# Patient Record
Sex: MALE | Race: WHITE | Employment: OTHER | URBAN - METROPOLITAN AREA
[De-identification: names, ages, dates, MRNs, and addresses within clinical notes are randomized per-mention and may not be internally consistent; named-entity substitution may affect disease eponyms.]

---

## 2018-05-31 ENCOUNTER — HOSPITAL ENCOUNTER (OUTPATIENT)
Dept: PREADMISSION TESTING | Age: 73
Discharge: HOME | End: 2018-05-31
Attending: ORTHOPAEDIC SURGERY
Payer: MEDICARE

## 2018-05-31 LAB
ALBUMIN SERPL-MCNC: 3.9 G/DL (ref 3.4–5)
ALBUMIN/GLOB SERPL: 1.1 {RATIO} (ref 0.8–1.7)
ALP SERPL-CCNC: 75 U/L (ref 45–117)
ALT SERPL-CCNC: 34 U/L (ref 16–61)
ANION GAP SERPL CALC-SCNC: 12 MMOL/L (ref 3–18)
APPEARANCE UR: CLEAR
AST SERPL-CCNC: 29 U/L (ref 15–37)
ATRIAL RATE: 61 BPM
BACTERIA SPEC CULT: NORMAL
BACTERIA URNS QL MICRO: ABNORMAL /HPF
BILIRUB SERPL-MCNC: 0.3 MG/DL (ref 0.2–1)
BILIRUB UR QL: NEGATIVE
BUN SERPL-MCNC: 28 MG/DL (ref 7–18)
BUN/CREAT SERPL: 18 (ref 12–20)
CALCIUM SERPL-MCNC: 9.3 MG/DL (ref 8.5–10.1)
CALCULATED P AXIS, ECG09: 76 DEGREES
CALCULATED R AXIS, ECG10: 57 DEGREES
CALCULATED T AXIS, ECG11: 51 DEGREES
CHLORIDE SERPL-SCNC: 103 MMOL/L (ref 100–108)
CO2 SERPL-SCNC: 23 MMOL/L (ref 21–32)
COLOR UR: ABNORMAL
CREAT SERPL-MCNC: 1.58 MG/DL (ref 0.6–1.3)
DIAGNOSIS, 93000: NORMAL
EPITH CASTS URNS QL MICRO: ABNORMAL /LPF (ref 0–5)
ERYTHROCYTE [DISTWIDTH] IN BLOOD BY AUTOMATED COUNT: 14.5 % (ref 11.6–14.5)
EST. AVERAGE GLUCOSE BLD GHB EST-MCNC: 169 MG/DL
GLOBULIN SER CALC-MCNC: 3.4 G/DL (ref 2–4)
GLUCOSE SERPL-MCNC: 258 MG/DL (ref 74–99)
GLUCOSE UR STRIP.AUTO-MCNC: NEGATIVE MG/DL
HBA1C MFR BLD: 7.5 % (ref 4.5–5.6)
HCT VFR BLD AUTO: 38.1 % (ref 36–48)
HGB BLD-MCNC: 12.7 G/DL (ref 13–16)
HGB UR QL STRIP: NEGATIVE
KETONES UR QL STRIP.AUTO: NEGATIVE MG/DL
LEUKOCYTE ESTERASE UR QL STRIP.AUTO: NEGATIVE
MCH RBC QN AUTO: 32.2 PG (ref 24–34)
MCHC RBC AUTO-ENTMCNC: 33.3 G/DL (ref 31–37)
MCV RBC AUTO: 96.5 FL (ref 74–97)
NITRITE UR QL STRIP.AUTO: NEGATIVE
P-R INTERVAL, ECG05: 204 MS
PH UR STRIP: 5 [PH] (ref 5–8)
PLATELET # BLD AUTO: 288 K/UL (ref 135–420)
PMV BLD AUTO: 9.3 FL (ref 9.2–11.8)
POTASSIUM SERPL-SCNC: 4.8 MMOL/L (ref 3.5–5.5)
PROT SERPL-MCNC: 7.3 G/DL (ref 6.4–8.2)
PROT UR STRIP-MCNC: 30 MG/DL
Q-T INTERVAL, ECG07: 406 MS
QRS DURATION, ECG06: 92 MS
QTC CALCULATION (BEZET), ECG08: 408 MS
RBC # BLD AUTO: 3.95 M/UL (ref 4.7–5.5)
RBC #/AREA URNS HPF: ABNORMAL /HPF (ref 0–5)
SERVICE CMNT-IMP: NORMAL
SODIUM SERPL-SCNC: 138 MMOL/L (ref 136–145)
SP GR UR REFRACTOMETRY: 1.02 (ref 1–1.03)
UROBILINOGEN UR QL STRIP.AUTO: 1 EU/DL (ref 0.2–1)
VENTRICULAR RATE, ECG03: 61 BPM
WBC # BLD AUTO: 7.8 K/UL (ref 4.6–13.2)
WBC URNS QL MICRO: ABNORMAL /HPF (ref 0–5)

## 2018-05-31 PROCEDURE — 85027 COMPLETE CBC AUTOMATED: CPT | Performed by: ORTHOPAEDIC SURGERY

## 2018-05-31 PROCEDURE — 83036 HEMOGLOBIN GLYCOSYLATED A1C: CPT | Performed by: ORTHOPAEDIC SURGERY

## 2018-05-31 PROCEDURE — 87641 MR-STAPH DNA AMP PROBE: CPT | Performed by: ORTHOPAEDIC SURGERY

## 2018-05-31 PROCEDURE — 81001 URINALYSIS AUTO W/SCOPE: CPT | Performed by: ORTHOPAEDIC SURGERY

## 2018-05-31 PROCEDURE — 87086 URINE CULTURE/COLONY COUNT: CPT | Performed by: ORTHOPAEDIC SURGERY

## 2018-05-31 PROCEDURE — 93005 ELECTROCARDIOGRAM TRACING: CPT

## 2018-05-31 PROCEDURE — 36415 COLL VENOUS BLD VENIPUNCTURE: CPT | Performed by: ORTHOPAEDIC SURGERY

## 2018-05-31 PROCEDURE — 80053 COMPREHEN METABOLIC PANEL: CPT | Performed by: ORTHOPAEDIC SURGERY

## 2018-06-01 ENCOUNTER — HOSPITAL ENCOUNTER (OUTPATIENT)
Dept: PREADMISSION TESTING | Age: 73
Discharge: HOME | End: 2018-06-01

## 2018-06-02 LAB
BACTERIA SPEC CULT: NORMAL
SERVICE CMNT-IMP: NORMAL

## 2018-09-12 ENCOUNTER — HOSPITAL ENCOUNTER (OUTPATIENT)
Dept: PREADMISSION TESTING | Age: 73
Discharge: HOME | End: 2018-09-12
Payer: MEDICARE

## 2018-09-12 VITALS — BODY MASS INDEX: 42.66 KG/M2 | HEIGHT: 72 IN | WEIGHT: 315 LBS

## 2018-09-12 LAB
ALBUMIN SERPL-MCNC: 3.5 G/DL (ref 3.4–5)
ALBUMIN/GLOB SERPL: 1 {RATIO} (ref 0.8–1.7)
ALP SERPL-CCNC: 74 U/L (ref 45–117)
ALT SERPL-CCNC: 29 U/L (ref 16–61)
ANION GAP SERPL CALC-SCNC: 11 MMOL/L (ref 3–18)
APPEARANCE UR: CLEAR
AST SERPL-CCNC: 19 U/L (ref 15–37)
BACTERIA SPEC CULT: NORMAL
BILIRUB SERPL-MCNC: 0.2 MG/DL (ref 0.2–1)
BILIRUB UR QL: NEGATIVE
BUN SERPL-MCNC: 60 MG/DL (ref 7–18)
BUN/CREAT SERPL: 25 (ref 12–20)
CALCIUM SERPL-MCNC: 9.1 MG/DL (ref 8.5–10.1)
CHLORIDE SERPL-SCNC: 107 MMOL/L (ref 100–108)
CO2 SERPL-SCNC: 22 MMOL/L (ref 21–32)
COLOR UR: YELLOW
CREAT SERPL-MCNC: 2.42 MG/DL (ref 0.6–1.3)
ERYTHROCYTE [DISTWIDTH] IN BLOOD BY AUTOMATED COUNT: 14.3 % (ref 11.6–14.5)
GLOBULIN SER CALC-MCNC: 3.4 G/DL (ref 2–4)
GLUCOSE SERPL-MCNC: 180 MG/DL (ref 74–99)
GLUCOSE UR STRIP.AUTO-MCNC: NEGATIVE MG/DL
HBA1C MFR BLD: 6.7 % (ref 4.5–5.6)
HCT VFR BLD AUTO: 35.7 % (ref 36–48)
HGB BLD-MCNC: 11.7 G/DL (ref 13–16)
HGB UR QL STRIP: NEGATIVE
KETONES UR QL STRIP.AUTO: NEGATIVE MG/DL
LEUKOCYTE ESTERASE UR QL STRIP.AUTO: NEGATIVE
MCH RBC QN AUTO: 31.6 PG (ref 24–34)
MCHC RBC AUTO-ENTMCNC: 32.8 G/DL (ref 31–37)
MCV RBC AUTO: 96.5 FL (ref 74–97)
NITRITE UR QL STRIP.AUTO: NEGATIVE
PH UR STRIP: 5.5 [PH] (ref 5–8)
PLATELET # BLD AUTO: 257 K/UL (ref 135–420)
PMV BLD AUTO: 10.1 FL (ref 9.2–11.8)
POTASSIUM SERPL-SCNC: 5.8 MMOL/L (ref 3.5–5.5)
PROT SERPL-MCNC: 6.9 G/DL (ref 6.4–8.2)
PROT UR STRIP-MCNC: NEGATIVE MG/DL
RBC # BLD AUTO: 3.7 M/UL (ref 4.7–5.5)
SERVICE CMNT-IMP: NORMAL
SODIUM SERPL-SCNC: 140 MMOL/L (ref 136–145)
SP GR UR REFRACTOMETRY: 1.01 (ref 1–1.03)
UROBILINOGEN UR QL STRIP.AUTO: 0.2 EU/DL (ref 0.2–1)
WBC # BLD AUTO: 6.5 K/UL (ref 4.6–13.2)

## 2018-09-12 PROCEDURE — 81003 URINALYSIS AUTO W/O SCOPE: CPT | Performed by: ORTHOPAEDIC SURGERY

## 2018-09-12 PROCEDURE — 93005 ELECTROCARDIOGRAM TRACING: CPT

## 2018-09-12 PROCEDURE — 85027 COMPLETE CBC AUTOMATED: CPT | Performed by: ORTHOPAEDIC SURGERY

## 2018-09-12 PROCEDURE — 87641 MR-STAPH DNA AMP PROBE: CPT | Performed by: ORTHOPAEDIC SURGERY

## 2018-09-12 PROCEDURE — 83036 HEMOGLOBIN GLYCOSYLATED A1C: CPT | Performed by: ORTHOPAEDIC SURGERY

## 2018-09-12 PROCEDURE — 87086 URINE CULTURE/COLONY COUNT: CPT | Performed by: ORTHOPAEDIC SURGERY

## 2018-09-12 PROCEDURE — 80053 COMPREHEN METABOLIC PANEL: CPT | Performed by: ORTHOPAEDIC SURGERY

## 2018-09-12 PROCEDURE — 36415 COLL VENOUS BLD VENIPUNCTURE: CPT | Performed by: ORTHOPAEDIC SURGERY

## 2018-09-12 RX ORDER — DICLOFENAC SODIUM 10 MG/G
GEL TOPICAL AS NEEDED
COMMUNITY
End: 2018-09-29

## 2018-09-12 RX ORDER — SODIUM CHLORIDE, SODIUM LACTATE, POTASSIUM CHLORIDE, CALCIUM CHLORIDE 600; 310; 30; 20 MG/100ML; MG/100ML; MG/100ML; MG/100ML
125 INJECTION, SOLUTION INTRAVENOUS CONTINUOUS
Status: CANCELLED | OUTPATIENT
Start: 2018-09-26

## 2018-09-12 RX ORDER — TAMSULOSIN HYDROCHLORIDE 0.4 MG/1
0.4 CAPSULE ORAL DAILY
COMMUNITY

## 2018-09-12 RX ORDER — ALLOPURINOL 100 MG/1
100 TABLET ORAL 2 TIMES DAILY
COMMUNITY

## 2018-09-12 RX ORDER — ASPIRIN 325 MG
325 TABLET ORAL DAILY
COMMUNITY
End: 2018-09-29

## 2018-09-12 RX ORDER — GABAPENTIN 600 MG/1
1200 TABLET ORAL
COMMUNITY

## 2018-09-12 NOTE — PERIOP NOTES
Type and screen ,FBS on admit Instructed to bring cpap machine does not meet criteria for spec population emilia prep reviewed ,PCP aware of scheduled surgery ,patient was instructed to check with PCP or Dr Boom Mccray need to stop aspirin prior surgery

## 2018-09-13 LAB
ATRIAL RATE: 83 BPM
CALCULATED P AXIS, ECG09: 87 DEGREES
CALCULATED R AXIS, ECG10: 77 DEGREES
CALCULATED T AXIS, ECG11: 57 DEGREES
DIAGNOSIS, 93000: NORMAL
P-R INTERVAL, ECG05: 306 MS
Q-T INTERVAL, ECG07: 360 MS
QRS DURATION, ECG06: 94 MS
QTC CALCULATION (BEZET), ECG08: 423 MS
VENTRICULAR RATE, ECG03: 83 BPM

## 2018-09-14 LAB
BACTERIA SPEC CULT: NORMAL
SERVICE CMNT-IMP: NORMAL

## 2018-09-14 NOTE — PERIOP NOTES
Anesthesia comment re: increasing creatinine faxed to [facility]' office and message left for [facility]. Office closed early due to weather.

## 2018-09-24 PROBLEM — M17.11 PRIMARY OSTEOARTHRITIS OF RIGHT KNEE: Chronic | Status: ACTIVE | Noted: 2018-09-24

## 2018-09-24 NOTE — H&P
History and Physical 
 
 
 
Patient: Ana Rosa Bermudez               Sex: male          DOA: (Not on file) YOB: 1945      Age:  68 y.o.        LOS:  LOS: 0 days HPI:  
 
Nadege Vizcaino is in for followup of his right severe lateral tibiofemoral DJD/status post left ATTUNE TKA by Dr. 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 The patient's left knee is doing well. His left hip is bothering him some but just intermittently. His right knee is giving way on him and giving him more and more pain. It seems to be anterior and medial pain today. Standing AP, tunnel, lateral, and sunrise views of the bilateral knees were obtained and interpreted in the office and show good position of his left TKA without migration, loosening, or osteolysis. The right knee shows he has severe lateral tibiofemoral and moderate patellofemoral DJD. Past Medical History:  
Diagnosis Date  Agent orange exposure  Arthritis   
 osteo  Cancer ([facility] [address] [facility]  Diabetes ([facility] [address] [facility]  Diabetes mellitus  Edema   
 legs  Essential hypertension  GERD (gastroesophageal reflux disease)  Hypertension  Insomnia  Morbid obesity ([address]  Neuropathy  Neuropathy 1984  Pituitary adenoma ([facility] [address]  Skin cancer  Unspecified sleep apnea   
 uses CPAP Past Surgical History:  
Procedure Laterality Date  HX ADENOIDECTOMY  HX HEENT    
 sinus, deviated septum repair  HX KNEE REPLACEMENT Left 2014  HX MASTECTOMY Left   
 partial  
 HX ORTHOPAEDIC Left   
 arm- fracture  HX ORTHOPAEDIC Left   
 hardware removal  
 HX OTHER SURGICAL    
 lumps removed from earlobes  HX OTHER SURGICAL    
 numerous lumps removed from various sites  HX OTHER SURGICAL    
 BCC removed  HX TONSILLECTOMY  HX VASECTOMY No family history on file. Social History Social History  Marital status:    Spouse name: N/A  
  Number of children: N/A  
 Years of education: N/A Social History Main Topics  Smoking status: Former Smoker Packs/day: 1.50 Years: 20.00 Types: Cigarettes Quit date: 1/1/1996  Smokeless tobacco: Never Used  Alcohol use Yes Comment: rarely  Drug use: No  
 Sexual activity: Not on file Other Topics Concern  Not on file Social History Narrative Prior to Admission medications Medication Sig Start Date End Date Taking? Authorizing Provider  
allopurinol (ZYLOPRIM) 100 mg tablet Take 100 mg by mouth two (2) times a day. Historical Provider  
aspirin (ASPIRIN) 325 mg tablet Take 325 mg by mouth daily. Historical Provider  
diclofenac (VOLTAREN) 1 % gel Apply  to affected area as needed. Historical Provider  
gabapentin (NEURONTIN) 600 mg tablet Take 1,200 mg by mouth nightly. Historical Provider  
ferrous fumarate/vit Bcomp,C (SUPER B COMPLEX PO) Take  by mouth daily. Historical Provider  
tamsulosin (FLOMAX) 0.4 mg capsule Take 0.4 mg by mouth daily. Historical Provider  
insulin glargine (LANTUS SOLOSTAR) 100 unit/mL (3 mL) pen 82 Units by SubCUTAneous route two (2) times a day. Indications: type 2 diabetes mellitus    Historical Provider  
psyllium (METAMUCIL) powd Take  by mouth as needed. Historical Provider  
lisinopril (PRINIVIL, ZESTRIL) 40 mg tablet Take 40 mg by mouth daily. Indications: hypertension    Historical Provider  
omeprazole (PRILOSEC) 20 mg capsule Take 20 mg by mouth daily. Pt instructed to take am of surgery. Indications: GASTROESOPHAGEAL REFLUX    Historical Provider  
potassium chloride SR (KLOR-CON) 8 mEq tablet Take 8 mEq by mouth daily. Historical Provider  
rosuvastatin (CRESTOR) 40 mg tablet Take 40 mg by mouth nightly. NON FORMULARY  Indications: HYPERCHOLESTEROLEMIA    Historical Provider  
furosemide (LASIX) 40 mg tablet Take 40 mg by mouth two (2) times a day. Indications: EDEMA    Historical Provider losartan (COZAAR) 50 mg tablet Take 50 mg by mouth daily. Indications: hypertension    Historical Provider  
gabapentin (NEURONTIN) 300 mg capsule Take 600 mg by mouth daily. Indications: NEUROPATHIC PAIN    Historical Provider  
glimepiride (AMARYL) 2 mg tablet Take 4 mg by mouth two (2) times a day. Indications: type 2 diabetes mellitus    Historical Provider NIFEdipine CR (ADALAT CC) 60 mg CR tablet Take 120 mg by mouth daily. Pt instructed to take am of surgery. Indications: hypertension    Historical Provider No Known Allergies Review of Systems A comprehensive review of systems was negative except for that written in the History of Present Illness. Physical Exam:  
  
There were no vitals taken for this visit. Physical Exam: 
Exam of the patient's right knee, he has mild genu valgum. He has almost full extension with flexion beyond 90 degrees. Otherwise, examination of the knee demonstrates the patient has a negative Lachman and has no varus or valgus instability at zero degrees or 30 degrees. There is a negative posterior sag. There is no knee effusion. There is no swelling, ecchymosis, or wounds. The patient has no medial or lateral joint line pain and no popliteal pain. The patient has a negative patellar inhibition, a negative patellar grind, and a negative patellar apprehension test.  There is a negative Brodie Maneuver. There is no pain at the inferior pole of the patella or the tibial tubercle. The patient has 5/5 muscle strength with good quadriceps tone. The patient has good capillary refill with normal motor strength of the foot and ankle and normal light-touch sensation in the foot and lower leg. Assessment/Plan Principal Problem: 
  Primary osteoarthritis of right knee (9/24/2018) Active Problems: Hypertension (2/18/2014) Gastroesophageal reflux disease (2/18/2014) Diabetes mellitus ([facility] (2/18/2014) Sleep apnea (2/18/2014) Hypercholesteremia (2/18/2014) Dr. Lorna Yin asked him to follow up two weeks postop. Dr. Lorna Yin is going to schedule him for a right DePuy ATTUNE TKA. He will be in the hospital overnight. We talked about the risks, alternatives, and benefits including infection, pain, bleeding, and DVT, and he is willing to proceed. He is on a full strength aspirin regularly per day, so we will just do full strength aspirin twice a day afterwards. Dr. Lorna Yin will give him perioperative IV antibiotics.

## 2018-09-25 ENCOUNTER — ANESTHESIA EVENT (OUTPATIENT)
Dept: SURGERY | Age: 73
DRG: 470 | End: 2018-09-25
Payer: MEDICARE

## 2018-09-25 RX ORDER — GUAIFENESIN 100 MG/5ML
81 LIQUID (ML) ORAL 2 TIMES DAILY
Status: CANCELLED | OUTPATIENT
Start: 2018-09-25

## 2018-09-26 ENCOUNTER — ANESTHESIA (OUTPATIENT)
Dept: SURGERY | Age: 73
DRG: 470 | End: 2018-09-26
Payer: MEDICARE

## 2018-09-26 ENCOUNTER — HOSPITAL ENCOUNTER (INPATIENT)
Age: 73
LOS: 3 days | Discharge: NURSING, REHAB OR LONG-TERM CARE | DRG: 470 | End: 2018-09-29
Attending: ORTHOPAEDIC SURGERY | Admitting: ORTHOPAEDIC SURGERY
Payer: MEDICARE

## 2018-09-26 DIAGNOSIS — M17.11 PRIMARY OSTEOARTHRITIS OF RIGHT KNEE: Primary | Chronic | ICD-10-CM

## 2018-09-26 LAB
ABO + RH BLD: NORMAL
BLOOD GROUP ANTIBODIES SERPL: NORMAL
GLUCOSE BLD STRIP.AUTO-MCNC: 119 MG/DL (ref 70–110)
GLUCOSE BLD STRIP.AUTO-MCNC: 168 MG/DL (ref 70–110)
GLUCOSE BLD STRIP.AUTO-MCNC: 206 MG/DL (ref 70–110)
GLUCOSE BLD STRIP.AUTO-MCNC: 208 MG/DL (ref 70–110)
SPECIMEN EXP DATE BLD: NORMAL

## 2018-09-26 PROCEDURE — 74011250636 HC RX REV CODE- 250/636: Performed by: ORTHOPAEDIC SURGERY

## 2018-09-26 PROCEDURE — 97161 PT EVAL LOW COMPLEX 20 MIN: CPT

## 2018-09-26 PROCEDURE — 76060000034 HC ANESTHESIA 1.5 TO 2 HR: Performed by: ORTHOPAEDIC SURGERY

## 2018-09-26 PROCEDURE — 74011250637 HC RX REV CODE- 250/637: Performed by: PHYSICIAN ASSISTANT

## 2018-09-26 PROCEDURE — C1713 ANCHOR/SCREW BN/BN,TIS/BN: HCPCS | Performed by: ORTHOPAEDIC SURGERY

## 2018-09-26 PROCEDURE — 77030032490 HC SLV COMPR SCD KNE COVD -B: Performed by: ORTHOPAEDIC SURGERY

## 2018-09-26 PROCEDURE — 77030012508 HC MSK AIRWY LMA AMBU -A: Performed by: ANESTHESIOLOGY

## 2018-09-26 PROCEDURE — 77030018836 HC SOL IRR NACL ICUM -A: Performed by: ORTHOPAEDIC SURGERY

## 2018-09-26 PROCEDURE — L1830 KO IMMOB CANVAS LONG PRE OTS: HCPCS | Performed by: ORTHOPAEDIC SURGERY

## 2018-09-26 PROCEDURE — 65270000029 HC RM PRIVATE

## 2018-09-26 PROCEDURE — 0SRC0J9 REPLACEMENT OF RIGHT KNEE JOINT WITH SYNTHETIC SUBSTITUTE, CEMENTED, OPEN APPROACH: ICD-10-PCS | Performed by: ORTHOPAEDIC SURGERY

## 2018-09-26 PROCEDURE — 77030013708 HC HNDPC SUC IRR PULS STRY –B: Performed by: ORTHOPAEDIC SURGERY

## 2018-09-26 PROCEDURE — C1776 JOINT DEVICE (IMPLANTABLE): HCPCS | Performed by: ORTHOPAEDIC SURGERY

## 2018-09-26 PROCEDURE — 74011250636 HC RX REV CODE- 250/636: Performed by: PHYSICIAN ASSISTANT

## 2018-09-26 PROCEDURE — 74011636637 HC RX REV CODE- 636/637: Performed by: PHYSICIAN ASSISTANT

## 2018-09-26 PROCEDURE — 74011000250 HC RX REV CODE- 250

## 2018-09-26 PROCEDURE — 74011000250 HC RX REV CODE- 250: Performed by: ORTHOPAEDIC SURGERY

## 2018-09-26 PROCEDURE — 77030037875 HC DRSG MEPILEX <16IN BORD MOLN -A: Performed by: ORTHOPAEDIC SURGERY

## 2018-09-26 PROCEDURE — 77030016060 HC NDL NRV BLK TELE -A: Performed by: ANESTHESIOLOGY

## 2018-09-26 PROCEDURE — 97116 GAIT TRAINING THERAPY: CPT

## 2018-09-26 PROCEDURE — 74011250636 HC RX REV CODE- 250/636

## 2018-09-26 PROCEDURE — 77030003666 HC NDL SPINAL BD -A: Performed by: ORTHOPAEDIC SURGERY

## 2018-09-26 PROCEDURE — 74011250637 HC RX REV CODE- 250/637: Performed by: ANESTHESIOLOGY

## 2018-09-26 PROCEDURE — 82962 GLUCOSE BLOOD TEST: CPT

## 2018-09-26 PROCEDURE — 64450 NJX AA&/STRD OTHER PN/BRANCH: CPT | Performed by: ANESTHESIOLOGY

## 2018-09-26 PROCEDURE — 77030038020 HC MANFLD NEPTUNE STRY -B: Performed by: ORTHOPAEDIC SURGERY

## 2018-09-26 PROCEDURE — 77030031139 HC SUT VCRL2 J&J -A: Performed by: ORTHOPAEDIC SURGERY

## 2018-09-26 PROCEDURE — 77030020782 HC GWN BAIR PAWS FLX 3M -B: Performed by: ORTHOPAEDIC SURGERY

## 2018-09-26 PROCEDURE — 77030039267 HC ADH SKN EXOFIN S2SG -B: Performed by: ORTHOPAEDIC SURGERY

## 2018-09-26 PROCEDURE — 3E0T3BZ INTRODUCTION OF ANESTHETIC AGENT INTO PERIPHERAL NERVES AND PLEXI, PERCUTANEOUS APPROACH: ICD-10-PCS | Performed by: ANESTHESIOLOGY

## 2018-09-26 PROCEDURE — 76942 ECHO GUIDE FOR BIOPSY: CPT | Performed by: ORTHOPAEDIC SURGERY

## 2018-09-26 PROCEDURE — 77030002934 HC SUT MCRYL J&J -B: Performed by: ORTHOPAEDIC SURGERY

## 2018-09-26 PROCEDURE — 94660 CPAP INITIATION&MGMT: CPT

## 2018-09-26 PROCEDURE — 77030034694 HC SCPL CANADY PLSM DISP USMD -E: Performed by: ORTHOPAEDIC SURGERY

## 2018-09-26 PROCEDURE — 86900 BLOOD TYPING SEROLOGIC ABO: CPT | Performed by: ORTHOPAEDIC SURGERY

## 2018-09-26 PROCEDURE — 76210000016 HC OR PH I REC 1 TO 1.5 HR: Performed by: ORTHOPAEDIC SURGERY

## 2018-09-26 PROCEDURE — 77030038010: Performed by: ORTHOPAEDIC SURGERY

## 2018-09-26 PROCEDURE — 77030038011: Performed by: ORTHOPAEDIC SURGERY

## 2018-09-26 PROCEDURE — 77030027138 HC INCENT SPIROMETER -A: Performed by: ORTHOPAEDIC SURGERY

## 2018-09-26 PROCEDURE — 74011636637 HC RX REV CODE- 636/637: Performed by: ANESTHESIOLOGY

## 2018-09-26 PROCEDURE — 77030036563 HC WRP CLD THER KNE S2SG -B: Performed by: ORTHOPAEDIC SURGERY

## 2018-09-26 PROCEDURE — 74011000258 HC RX REV CODE- 258: Performed by: ORTHOPAEDIC SURGERY

## 2018-09-26 PROCEDURE — 76010000153 HC OR TIME 1.5 TO 2 HR: Performed by: ORTHOPAEDIC SURGERY

## 2018-09-26 DEVICE — COMPONENT PAT DIA38MM KNEE POLY CEM MEDIALIZED ANAT ATTUNE: Type: IMPLANTABLE DEVICE | Site: KNEE | Status: FUNCTIONAL

## 2018-09-26 DEVICE — IMPLANTABLE DEVICE: Type: IMPLANTABLE DEVICE | Site: KNEE | Status: FUNCTIONAL

## 2018-09-26 DEVICE — CEMENT BNE 20GM HALF DOSE PMMA VISC RADPQ FAST: Type: IMPLANTABLE DEVICE | Site: KNEE | Status: FUNCTIONAL

## 2018-09-26 DEVICE — INSERT TIB RP FEM KNEE CEM: Type: IMPLANTABLE DEVICE | Site: KNEE | Status: FUNCTIONAL

## 2018-09-26 RX ORDER — SODIUM CHLORIDE 0.9 % (FLUSH) 0.9 %
5-10 SYRINGE (ML) INJECTION AS NEEDED
Status: DISCONTINUED | OUTPATIENT
Start: 2018-09-26 | End: 2018-09-26 | Stop reason: HOSPADM

## 2018-09-26 RX ORDER — SODIUM CHLORIDE, SODIUM LACTATE, POTASSIUM CHLORIDE, CALCIUM CHLORIDE 600; 310; 30; 20 MG/100ML; MG/100ML; MG/100ML; MG/100ML
125 INJECTION, SOLUTION INTRAVENOUS CONTINUOUS
Status: DISCONTINUED | OUTPATIENT
Start: 2018-09-26 | End: 2018-09-26 | Stop reason: HOSPADM

## 2018-09-26 RX ORDER — FUROSEMIDE 40 MG/1
40 TABLET ORAL 2 TIMES DAILY
Status: DISCONTINUED | OUTPATIENT
Start: 2018-09-27 | End: 2018-09-29 | Stop reason: HOSPADM

## 2018-09-26 RX ORDER — LIDOCAINE HYDROCHLORIDE 20 MG/ML
INJECTION, SOLUTION EPIDURAL; INFILTRATION; INTRACAUDAL; PERINEURAL AS NEEDED
Status: DISCONTINUED | OUTPATIENT
Start: 2018-09-26 | End: 2018-09-26 | Stop reason: HOSPADM

## 2018-09-26 RX ORDER — SODIUM CHLORIDE 0.9 % (FLUSH) 0.9 %
5-10 SYRINGE (ML) INJECTION AS NEEDED
Status: DISCONTINUED | OUTPATIENT
Start: 2018-09-26 | End: 2018-09-26

## 2018-09-26 RX ORDER — SODIUM CHLORIDE, SODIUM LACTATE, POTASSIUM CHLORIDE, CALCIUM CHLORIDE 600; 310; 30; 20 MG/100ML; MG/100ML; MG/100ML; MG/100ML
125 INJECTION, SOLUTION INTRAVENOUS CONTINUOUS
Status: DISCONTINUED | OUTPATIENT
Start: 2018-09-26 | End: 2018-09-29 | Stop reason: HOSPADM

## 2018-09-26 RX ORDER — ONDANSETRON 4 MG/1
4 TABLET, ORALLY DISINTEGRATING ORAL
Status: DISCONTINUED | OUTPATIENT
Start: 2018-09-26 | End: 2018-09-29 | Stop reason: HOSPADM

## 2018-09-26 RX ORDER — OMEPRAZOLE 20 MG/1
20 CAPSULE, DELAYED RELEASE ORAL DAILY
Status: DISCONTINUED | OUTPATIENT
Start: 2018-09-27 | End: 2018-09-29 | Stop reason: HOSPADM

## 2018-09-26 RX ORDER — DEXTROSE 50 % IN WATER (D50W) INTRAVENOUS SYRINGE
25-50 AS NEEDED
Status: DISCONTINUED | OUTPATIENT
Start: 2018-09-26 | End: 2018-09-26

## 2018-09-26 RX ORDER — ONDANSETRON 2 MG/ML
INJECTION INTRAMUSCULAR; INTRAVENOUS AS NEEDED
Status: DISCONTINUED | OUTPATIENT
Start: 2018-09-26 | End: 2018-09-26 | Stop reason: HOSPADM

## 2018-09-26 RX ORDER — DEXMEDETOMIDINE HYDROCHLORIDE 4 UG/ML
INJECTION, SOLUTION INTRAVENOUS AS NEEDED
Status: DISCONTINUED | OUTPATIENT
Start: 2018-09-26 | End: 2018-09-26 | Stop reason: HOSPADM

## 2018-09-26 RX ORDER — POTASSIUM CHLORIDE 20MEQ/15ML
8 LIQUID (ML) ORAL DAILY
Status: DISCONTINUED | OUTPATIENT
Start: 2018-09-27 | End: 2018-09-27

## 2018-09-26 RX ORDER — ROSUVASTATIN CALCIUM 10 MG/1
40 TABLET, COATED ORAL
Status: DISCONTINUED | OUTPATIENT
Start: 2018-09-26 | End: 2018-09-29 | Stop reason: HOSPADM

## 2018-09-26 RX ORDER — NALOXONE HYDROCHLORIDE 0.4 MG/ML
0.4 INJECTION, SOLUTION INTRAMUSCULAR; INTRAVENOUS; SUBCUTANEOUS AS NEEDED
Status: DISCONTINUED | OUTPATIENT
Start: 2018-09-26 | End: 2018-09-29 | Stop reason: HOSPADM

## 2018-09-26 RX ORDER — ACETAMINOPHEN 325 MG/1
650 TABLET ORAL
Status: DISCONTINUED | OUTPATIENT
Start: 2018-09-26 | End: 2018-09-29 | Stop reason: HOSPADM

## 2018-09-26 RX ORDER — MIDAZOLAM HYDROCHLORIDE 1 MG/ML
INJECTION, SOLUTION INTRAMUSCULAR; INTRAVENOUS AS NEEDED
Status: DISCONTINUED | OUTPATIENT
Start: 2018-09-26 | End: 2018-09-26 | Stop reason: HOSPADM

## 2018-09-26 RX ORDER — CELECOXIB 100 MG/1
400 CAPSULE ORAL
Status: COMPLETED | OUTPATIENT
Start: 2018-09-26 | End: 2018-09-26

## 2018-09-26 RX ORDER — MAGNESIUM SULFATE 100 %
4 CRYSTALS MISCELLANEOUS AS NEEDED
Status: DISCONTINUED | OUTPATIENT
Start: 2018-09-26 | End: 2018-09-29 | Stop reason: HOSPADM

## 2018-09-26 RX ORDER — ALLOPURINOL 100 MG/1
100 TABLET ORAL 2 TIMES DAILY
Status: DISCONTINUED | OUTPATIENT
Start: 2018-09-26 | End: 2018-09-29 | Stop reason: HOSPADM

## 2018-09-26 RX ORDER — GABAPENTIN 300 MG/1
600 CAPSULE ORAL DAILY
Status: DISCONTINUED | OUTPATIENT
Start: 2018-09-27 | End: 2018-09-29 | Stop reason: HOSPADM

## 2018-09-26 RX ORDER — LOSARTAN POTASSIUM 50 MG/1
50 TABLET ORAL DAILY
Status: DISCONTINUED | OUTPATIENT
Start: 2018-09-27 | End: 2018-09-29 | Stop reason: HOSPADM

## 2018-09-26 RX ORDER — TAMSULOSIN HYDROCHLORIDE 0.4 MG/1
0.4 CAPSULE ORAL DAILY
Status: DISCONTINUED | OUTPATIENT
Start: 2018-09-27 | End: 2018-09-29 | Stop reason: HOSPADM

## 2018-09-26 RX ORDER — HYDROMORPHONE HYDROCHLORIDE 2 MG/ML
0.5 INJECTION, SOLUTION INTRAMUSCULAR; INTRAVENOUS; SUBCUTANEOUS
Status: DISCONTINUED | OUTPATIENT
Start: 2018-09-26 | End: 2018-09-26

## 2018-09-26 RX ORDER — MAGNESIUM SULFATE 100 %
4 CRYSTALS MISCELLANEOUS AS NEEDED
Status: DISCONTINUED | OUTPATIENT
Start: 2018-09-26 | End: 2018-09-26

## 2018-09-26 RX ORDER — MULTIVIT WITH MINERALS/HERBS
1 TABLET ORAL DAILY
Status: DISCONTINUED | OUTPATIENT
Start: 2018-09-27 | End: 2018-09-29 | Stop reason: HOSPADM

## 2018-09-26 RX ORDER — INSULIN LISPRO 100 [IU]/ML
INJECTION, SOLUTION INTRAVENOUS; SUBCUTANEOUS
Status: DISCONTINUED | OUTPATIENT
Start: 2018-09-26 | End: 2018-09-29 | Stop reason: HOSPADM

## 2018-09-26 RX ORDER — OXYCODONE AND ACETAMINOPHEN 5; 325 MG/1; MG/1
1 TABLET ORAL AS NEEDED
Status: DISCONTINUED | OUTPATIENT
Start: 2018-09-26 | End: 2018-09-26

## 2018-09-26 RX ORDER — SODIUM CHLORIDE 0.9 % (FLUSH) 0.9 %
5-10 SYRINGE (ML) INJECTION EVERY 8 HOURS
Status: DISCONTINUED | OUTPATIENT
Start: 2018-09-26 | End: 2018-09-26 | Stop reason: HOSPADM

## 2018-09-26 RX ORDER — NALOXONE HYDROCHLORIDE 0.4 MG/ML
0.1 INJECTION, SOLUTION INTRAMUSCULAR; INTRAVENOUS; SUBCUTANEOUS
Status: DISCONTINUED | OUTPATIENT
Start: 2018-09-26 | End: 2018-09-26

## 2018-09-26 RX ORDER — LISINOPRIL 20 MG/1
40 TABLET ORAL DAILY
Status: DISCONTINUED | OUTPATIENT
Start: 2018-09-27 | End: 2018-09-29 | Stop reason: HOSPADM

## 2018-09-26 RX ORDER — SODIUM CHLORIDE, SODIUM LACTATE, POTASSIUM CHLORIDE, CALCIUM CHLORIDE 600; 310; 30; 20 MG/100ML; MG/100ML; MG/100ML; MG/100ML
50 INJECTION, SOLUTION INTRAVENOUS CONTINUOUS
Status: DISCONTINUED | OUTPATIENT
Start: 2018-09-26 | End: 2018-09-26

## 2018-09-26 RX ORDER — DIPHENHYDRAMINE HCL 25 MG
25 CAPSULE ORAL
Status: DISCONTINUED | OUTPATIENT
Start: 2018-09-26 | End: 2018-09-29 | Stop reason: HOSPADM

## 2018-09-26 RX ORDER — BISACODYL 5 MG
5 TABLET, DELAYED RELEASE (ENTERIC COATED) ORAL DAILY PRN
Status: DISCONTINUED | OUTPATIENT
Start: 2018-09-26 | End: 2018-09-29 | Stop reason: HOSPADM

## 2018-09-26 RX ORDER — ACETAMINOPHEN 500 MG
1000 TABLET ORAL ONCE
Status: COMPLETED | OUTPATIENT
Start: 2018-09-26 | End: 2018-09-26

## 2018-09-26 RX ORDER — FENTANYL CITRATE 50 UG/ML
25 INJECTION, SOLUTION INTRAMUSCULAR; INTRAVENOUS
Status: DISCONTINUED | OUTPATIENT
Start: 2018-09-26 | End: 2018-09-26

## 2018-09-26 RX ORDER — TRANEXAMIC ACID 650 1/1
1950 TABLET ORAL ONCE
Status: COMPLETED | OUTPATIENT
Start: 2018-09-26 | End: 2018-09-26

## 2018-09-26 RX ORDER — HYDROMORPHONE HYDROCHLORIDE 2 MG/ML
INJECTION, SOLUTION INTRAMUSCULAR; INTRAVENOUS; SUBCUTANEOUS AS NEEDED
Status: DISCONTINUED | OUTPATIENT
Start: 2018-09-26 | End: 2018-09-26 | Stop reason: HOSPADM

## 2018-09-26 RX ORDER — SODIUM CHLORIDE 0.9 % (FLUSH) 0.9 %
5-10 SYRINGE (ML) INJECTION AS NEEDED
Status: DISCONTINUED | OUTPATIENT
Start: 2018-09-26 | End: 2018-09-29 | Stop reason: HOSPADM

## 2018-09-26 RX ORDER — INSULIN LISPRO 100 [IU]/ML
INJECTION, SOLUTION INTRAVENOUS; SUBCUTANEOUS ONCE
Status: COMPLETED | OUTPATIENT
Start: 2018-09-26 | End: 2018-09-26

## 2018-09-26 RX ORDER — DEXTROSE 50 % IN WATER (D50W) INTRAVENOUS SYRINGE
25-50 AS NEEDED
Status: DISCONTINUED | OUTPATIENT
Start: 2018-09-26 | End: 2018-09-29 | Stop reason: HOSPADM

## 2018-09-26 RX ORDER — PROPOFOL 10 MG/ML
INJECTION, EMULSION INTRAVENOUS AS NEEDED
Status: DISCONTINUED | OUTPATIENT
Start: 2018-09-26 | End: 2018-09-26 | Stop reason: HOSPADM

## 2018-09-26 RX ORDER — SODIUM CHLORIDE 0.9 % (FLUSH) 0.9 %
5-10 SYRINGE (ML) INJECTION EVERY 8 HOURS
Status: DISCONTINUED | OUTPATIENT
Start: 2018-09-26 | End: 2018-09-29 | Stop reason: HOSPADM

## 2018-09-26 RX ORDER — ALBUTEROL SULFATE 90 UG/1
AEROSOL, METERED RESPIRATORY (INHALATION) AS NEEDED
Status: DISCONTINUED | OUTPATIENT
Start: 2018-09-26 | End: 2018-09-29 | Stop reason: HOSPADM

## 2018-09-26 RX ORDER — ONDANSETRON 2 MG/ML
4 INJECTION INTRAMUSCULAR; INTRAVENOUS ONCE
Status: DISCONTINUED | OUTPATIENT
Start: 2018-09-26 | End: 2018-09-26

## 2018-09-26 RX ORDER — SODIUM CHLORIDE, SODIUM LACTATE, POTASSIUM CHLORIDE, CALCIUM CHLORIDE 600; 310; 30; 20 MG/100ML; MG/100ML; MG/100ML; MG/100ML
75 INJECTION, SOLUTION INTRAVENOUS CONTINUOUS
Status: DISPENSED | OUTPATIENT
Start: 2018-09-26 | End: 2018-09-27

## 2018-09-26 RX ORDER — NIFEDIPINE 60 MG/1
120 TABLET, EXTENDED RELEASE ORAL DAILY
Status: DISCONTINUED | OUTPATIENT
Start: 2018-09-27 | End: 2018-09-29 | Stop reason: HOSPADM

## 2018-09-26 RX ORDER — OXYCODONE HYDROCHLORIDE 5 MG/1
5-10 TABLET ORAL
Status: DISCONTINUED | OUTPATIENT
Start: 2018-09-26 | End: 2018-09-27

## 2018-09-26 RX ORDER — GLYCOPYRROLATE 0.2 MG/ML
INJECTION INTRAMUSCULAR; INTRAVENOUS AS NEEDED
Status: DISCONTINUED | OUTPATIENT
Start: 2018-09-26 | End: 2018-09-26 | Stop reason: HOSPADM

## 2018-09-26 RX ORDER — ZOLPIDEM TARTRATE 5 MG/1
5 TABLET ORAL
Status: DISCONTINUED | OUTPATIENT
Start: 2018-09-26 | End: 2018-09-29 | Stop reason: HOSPADM

## 2018-09-26 RX ORDER — INSULIN LISPRO 100 [IU]/ML
INJECTION, SOLUTION INTRAVENOUS; SUBCUTANEOUS ONCE
Status: DISCONTINUED | OUTPATIENT
Start: 2018-09-26 | End: 2018-09-26

## 2018-09-26 RX ORDER — ASPIRIN 325 MG
325 TABLET ORAL 2 TIMES DAILY
Status: DISCONTINUED | OUTPATIENT
Start: 2018-09-26 | End: 2018-09-29 | Stop reason: HOSPADM

## 2018-09-26 RX ADMIN — TRANEXAMIC ACID 1950 MG: 650 TABLET ORAL at 10:02

## 2018-09-26 RX ADMIN — INSULIN LISPRO 6 UNITS: 100 INJECTION, SOLUTION INTRAVENOUS; SUBCUTANEOUS at 10:32

## 2018-09-26 RX ADMIN — CEFAZOLIN SODIUM 3 G: 10 INJECTION, POWDER, FOR SOLUTION INTRAVENOUS at 15:55

## 2018-09-26 RX ADMIN — ASPIRIN 325 MG ORAL TABLET 325 MG: 325 PILL ORAL at 18:49

## 2018-09-26 RX ADMIN — OXYCODONE HYDROCHLORIDE 5 MG: 5 TABLET ORAL at 20:33

## 2018-09-26 RX ADMIN — ONDANSETRON 4 MG: 2 INJECTION INTRAMUSCULAR; INTRAVENOUS at 10:49

## 2018-09-26 RX ADMIN — PROPOFOL 200 MG: 10 INJECTION, EMULSION INTRAVENOUS at 10:47

## 2018-09-26 RX ADMIN — SODIUM CHLORIDE, SODIUM LACTATE, POTASSIUM CHLORIDE, AND CALCIUM CHLORIDE 125 ML/HR: 600; 310; 30; 20 INJECTION, SOLUTION INTRAVENOUS at 09:48

## 2018-09-26 RX ADMIN — SODIUM CHLORIDE, SODIUM LACTATE, POTASSIUM CHLORIDE, AND CALCIUM CHLORIDE 75 ML/HR: 600; 310; 30; 20 INJECTION, SOLUTION INTRAVENOUS at 13:52

## 2018-09-26 RX ADMIN — Medication 3 G: at 10:40

## 2018-09-26 RX ADMIN — MIDAZOLAM HYDROCHLORIDE 2 MG: 1 INJECTION, SOLUTION INTRAMUSCULAR; INTRAVENOUS at 10:25

## 2018-09-26 RX ADMIN — SODIUM CHLORIDE, SODIUM LACTATE, POTASSIUM CHLORIDE, AND CALCIUM CHLORIDE: 600; 310; 30; 20 INJECTION, SOLUTION INTRAVENOUS at 11:34

## 2018-09-26 RX ADMIN — INSULIN LISPRO 4 UNITS: 100 INJECTION, SOLUTION INTRAVENOUS; SUBCUTANEOUS at 18:49

## 2018-09-26 RX ADMIN — DEXMEDETOMIDINE HYDROCHLORIDE 16 MCG: 4 INJECTION, SOLUTION INTRAVENOUS at 10:49

## 2018-09-26 RX ADMIN — LIDOCAINE HYDROCHLORIDE 80 MG: 20 INJECTION, SOLUTION EPIDURAL; INFILTRATION; INTRACAUDAL; PERINEURAL at 10:47

## 2018-09-26 RX ADMIN — HYDROMORPHONE HYDROCHLORIDE 2 MG: 2 INJECTION, SOLUTION INTRAMUSCULAR; INTRAVENOUS; SUBCUTANEOUS at 10:47

## 2018-09-26 RX ADMIN — SODIUM CHLORIDE, SODIUM LACTATE, POTASSIUM CHLORIDE, AND CALCIUM CHLORIDE 1000 ML: 600; 310; 30; 20 INJECTION, SOLUTION INTRAVENOUS at 09:48

## 2018-09-26 RX ADMIN — MIDAZOLAM HYDROCHLORIDE 2 MG: 1 INJECTION, SOLUTION INTRAMUSCULAR; INTRAVENOUS at 10:37

## 2018-09-26 RX ADMIN — ACETAMINOPHEN 1000 MG: 500 TABLET, FILM COATED ORAL at 10:02

## 2018-09-26 RX ADMIN — Medication 10 ML: at 22:16

## 2018-09-26 RX ADMIN — GLYCOPYRROLATE 0.2 MG: 0.2 INJECTION INTRAMUSCULAR; INTRAVENOUS at 10:37

## 2018-09-26 RX ADMIN — ALLOPURINOL 100 MG: 100 TABLET ORAL at 18:49

## 2018-09-26 RX ADMIN — ROSUVASTATIN CALCIUM 40 MG: 10 TABLET, FILM COATED ORAL at 22:16

## 2018-09-26 RX ADMIN — CELECOXIB 400 MG: 100 CAPSULE ORAL at 10:02

## 2018-09-26 RX ADMIN — SODIUM CHLORIDE, SODIUM LACTATE, POTASSIUM CHLORIDE, AND CALCIUM CHLORIDE 75 ML/HR: 600; 310; 30; 20 INJECTION, SOLUTION INTRAVENOUS at 20:34

## 2018-09-26 NOTE — PERIOP NOTES
TRANSFER - IN REPORT: 
 
Verbal report received from ORN and CRNA (name) on Dev Mcmillan  being received from OR (unit) for routine progression of care Report consisted of patients Situation, Background, Assessment and  
Recommendations(SBAR). Information from the following report(s) SBAR, Kardex, OR Summary, Procedure Summary, Intake/Output, MAR and Recent Results was reviewed with the receiving nurse. Opportunity for questions and clarification was provided. Assessment completed upon patients arrival to unit and care assumed.

## 2018-09-26 NOTE — OP NOTES
Right Tourniquetless Cruciate Retaining Cemented Total Knee Arthroplasty    Patient: Natalie Johns MRN: [id number]  CSN: [id number]   YOB: 1945  Age: 68 y.o. Sex: male      Date of Surgery:9/26/2018    PREOPERATIVE DIAGNOSES : RIGHT KNEE OSTEOARTHRITIS    POSTOPERATIVE DIAGNOSES : RIGHT KNEE OSTEOARTHRITIS    PROCEDURE: Right tourniquetless cruciate retaining cemented total knee arthroplasty using the Attune knee system by [employer]. IMPLANTS:   Implant Name Type Inv. Item Serial No.  Lot No. LRB No. Used Action   INSERT TIB CR RP SZ8 5MM -- ATTUNE - [id number]  INSERT TIB CR RP SZ8 5MM -- ATTUNE  [facility] [employer] [id number] Right 1 Implanted   ATTUNE KNEE SYSTEM TIBIAL BASE ROTATING PLATFORM SIZE 8 CEMENTED     [facility] [employer] [id number] Right 1 Implanted   CEMENT BNE FAST SET 20GM -- ORDER IN SETS OF 20 - [id number]  CEMENT BNE FAST SET 20GM -- ORDER IN SETS OF 20  [facility] [id number] Right 2 Implanted   FEM CR RT SZ 8 BOWEN -- ATTUNE - [id number]  FEM CR RT SZ 8 BOWEN -- ATTUNE  [facility] [employer] [id number] Right 1 Implanted   PAT BOWEN MEDIAL DEVAN 38MM -- ATTUNE - [id number]   PAT BOWEN MEDIAL DEVAN 38MM -- ATTUNE   [facility] [id number] Right 1 Implanted       SURGEON: Nicolasa Burgess MD    ASSISTANTS: Willis Woodard PA-C    ANESTHESIA: General    TOURNIQUET TIME:  None    SPECIMEN TO PATHOLOGY:  * No specimens in log *    ESTIMATED BLOOD LOSS: 75cc    FINDINGS:  Same    COMPLICATIONS:  None     INDICATIONS:  A 68 y.o.  male with known right severe gonarthrosis. The patient has bone-on-bone arthritis by x-rays and has failed all conservative interventions including medications, weight loss, physical therapy, relative rest, ambulatory aids and injections. The patient now presents for a right total knee arthroplasty due to constant pain with activities of daily living and diminished safety due to patients pain.       OPERATION:  The patient was brought to the operating theater   and, after adequate anesthesia, the right knee was prepped and draped in the   typical sterile fashion. The 1.95gm of po tranexamic acid was already administered 2 hours before surgery. The analgesic cocktail used for the case was 50cc of .25% Marcaine with epinephrine mixed with 30 mg( 1cc ) of Toradol, 10mg of Morphine Sulfate ( 1cc ) and 30cc of NS ( total of 82 cc). 40cc's was injected in the skin and capsule/quadriceps after the incision. The Argon Wand was used during the case for bleeding control. An anterior midline   incision was then made from just above the patella to the tibial tubercle. The medial and lateral flaps were developed and then a trivector approach   to the knee was then performed. The dissection was carried on the proximal   medial tibia from anterior to posterior, taking the anterior half of the   medial meniscus. The lateral joint line was exposed up to the anterolateral   corner, and then the patella was slid lateral and the knee flexed to   greater than 90 degrees with Z retractors being placed. Findings at this   time showed significant arthritic change. The ACL was resected. The PCL was preserved. The external alignment guide for the [employer] system was then lined up with the first webspace. The guide was made   parallel to the anterior tibia and then the cutting guide was placed at a 5   degree slope. It was placed so that approximately a couple of millimeters were   taken off the lowest side. It was then pinned and the proximal tibia was   then resected. Tibial resection cut alignment was performed with the drop down michel and found to be aligned with the first web space. The femur was then addressed next. The large drill bit was placed down the femoral canal and the distal femoral cutting guide was then pinned to the   anterior femur at 9 mm below the lowest surface, and was placed at a 5   degree valgus angle.  The distal femur was then resected, and extension gap   was measured and found to be a 5 mm polyethylene thickness. The knee was   then flexed to 90 degrees. The knee was kept at 90 degrees and  / sizer was inserted over the short IM michel and the anterior lateral femur was referenced. The size was then measured and noted. The guide was then distracted so flexion gap equalled extension gap ( and was stable ) and the guide was pinned. The femoral finishing guide was placed over the 2 pins and then 2 lock down pins were placed medially and laterally. The flexion gap   as checked again and found to be stable. The anterior and posterior cuts,   along with both chamfer cuts were then performed. The guide was removed, as   well as any free bony fragments. The posterior horns of both the medial and   lateral menisci were resected. The femoral resection guide was used to cut out the small amount of  bone for the CR femur. The attention was now turned to the tibia. The pickle fork was placed in the posterior part of the tibia and, using the lateral & medial knee retractors, the   tibia was brought into the wound. Any further bony work, as well as any   meniscal work was done at this time to remove these fragments. The tibia   was then sized with the tibial baseplate. This guide was aligned to the medial third of the tibial tubercle. It was pinned in position. The smokestack guide that was on the [employer] system was placed through the top of this baseplate, locking the plate in good position. The guide was then reamed down to the appropriate depth, and then the keel   punch was placed. The guide system was removed and the trial tibial   polyethylene was snapped into position, and then the appropriate size femur was   placed on the distal femur. There was good fit to both components. The natural patella tracked very well. These components were selected for implantation. The patella was everted.  It was measured and it was cut from the original thickness of 26 to the residual thickness of about 17 mm. It was cut by free hand technique and it was cut from the medial articular edge to the lateral articular edge. The patella was   then laterally electrocauterized and then the patellar clamp was   placed on the cut surface of the patella. It was placed perpendicular to   the trochlear groove and then it was flipped into position and the anatomic patella tracked well. The 3 peg holes were drilled and  the excessive bone on the lateral side was resected. The lateral retinaculum was released subperiosteally off the patella. The wound was then irrigated out copiously. The posterior medial and posterior lateral knee was injected with 20cc's each of the remaining analgesic cocktail. All trial components were removed and the real components were opened, and the SimGymuy #2 cement was mixed on the back table. The knee was then Simpulse lavaged and dried. The cement was then packed on the inferior surface of the tibial baseplate with cement down the cone. The tibia was then impacted. All excessive cement was removed with pickups and a knife. The tibial   polyethylene was then placed into the baseplate, and   then the femur was cemented next with cement being put on the posterior   part of each femoral runner, and then an additional amount of cement placed   in a U-shaped pattern around medial femoral condyle, anterior femur, and   the lateral femoral condyle. This was hand packed into the distal femur. The femoral component was then placed, impacted into position. Any   excessive cement was removed with pickups and a knife. The patella was   addressed next, and the anatomic patella was cemented by placing cement on the   posterior part of the patellar component. It was placed into the 3 drill   holes and held into position with a clamp until it hardened. The patient now had full extension to flexion of 120 degrees.   Once the cement was hardened, the clamp was removed. The patella   tracked with a no-thumbs technique very nicely. The wound was irrigated out. The skin was closed with a running #2 Quill stitch. The shin was then closed with inverted 2-0 Vicryls. The skin was sealed with the Dermabond   Prineo skin system, dressed with a Mepilex dressing then 4x4, ABD's and Large Ace wrap from toes to thigh. The patient returned to the recovery room awake, in   stable condition. All instrument, sponge and needle counts were correct. The knee was dressed with a Mepilex and an Ace wrap and a knee immobilzer. During multiple steps in the procedure the simpulse lavage was used with a 500cc bag of normal saline with 30cc of 5% sterile opthalmologic povidone solution ( .30% ). Before component implantation the bone was irrigated  with normal saline on a bulb syringe. At the end of the case another 500cc normal saline bag (with 50,000 units of bacitracin and 500,000 units of polymixin )was attached to the simpulse lavage and the entire wound reirrgated before closure.               Michaelle Aviles MD  9/26/2018

## 2018-09-26 NOTE — PROGRESS NOTES
RT called to help with patient's home Cpap machine. Patient's machine states it need to be seen by equipment provider. It will not work. Respiratory provided hospital Cpap machine with auto settings.

## 2018-09-26 NOTE — PROGRESS NOTES
Problem: Falls - Risk of 
Goal: *Absence of Falls Document Jennifer Shadow Fall Risk and appropriate interventions in the flowsheet. Outcome: Progressing Towards Goal 
Fall Risk Interventions: 
Mobility Interventions: Patient to call before getting OOB Medication Interventions: Patient to call before getting OOB Elimination Interventions: Call light in reach

## 2018-09-26 NOTE — PERIOP NOTES
Patient placed on Selma Paws for a minimum of 30 min in  Preop. Pt. Used restroom in pre-op area with assistance.

## 2018-09-26 NOTE — ROUTINE PROCESS
TRANSFER - IN REPORT: 
 
Verbal report received from [facility] on Ana Rosa Bermudez  being received from PACU for routine post - op. Report consisted of patients Situation, Background, Assessment and  
Recommendations(SBAR). Information from the following report(s) SBAR, Kardex, OR Summary, Intake/Output and MAR was reviewed with the receiving nurse. Opportunity for questions and clarification was provided. Assessment to be completed upon patients arrival to unit and care assumed.

## 2018-09-26 NOTE — INTERVAL H&P NOTE
H&P Update: 
Farhat Bland was seen and examined. History and physical has been reviewed. The patient has been examined. There have been no significant clinical changes since the completion of the originally dated History and Physical. 
Patient identified by surgeon; surgical site was confirmed by patient and surgeon.  
 
Signed By: Heath Taylor MD   
 September 26, 2018 9:49 AM

## 2018-09-26 NOTE — ANESTHESIA PROCEDURE NOTES
Peripheral Block Start time: 9/26/2018 10:25 AM 
End time: 9/26/2018 10:28 AM 
Performed by: Montrell Dixon Authorized by: Montrell Dixon Pre-procedure: Indications: at surgeon's request and procedure for pain Preanesthetic Checklist: patient identified, risks and benefits discussed, site marked, timeout performed, anesthesia consent given and patient being monitored Timeout Time: 10:25 Block Type:  
Block Type: Adductor canal 
Laterality:  Right Monitoring:  Standard ASA monitoring, responsive to questions, continuous pulse ox, oxygen, frequent vital sign checks and heart rate Injection Technique:  Single shot Procedures: ultrasound guided Patient Position: supine Prep: chlorhexidine Location:  Mid thigh Needle Type:  Stimuplex Needle Gauge:  21 G Needle Localization:  Anatomical landmarks and ultrasound guidance Medication Injected:  2.0% 
mepivacaine Volume (mL):  20 Add'l Medication Injected:  0.5% 
ropivacaine Volume (mL):  10 Assessment: 
Number of attempts:  1 Injection Assessment:  Incremental injection every 5 mL, no paresthesia, ultrasound image on chart, local visualized surrounding nerve on ultrasound, negative aspiration for blood and no intravascular symptoms Patient tolerance:  Patient tolerated the procedure well with no immediate complications

## 2018-09-26 NOTE — ANESTHESIA POSTPROCEDURE EVALUATION
Post-Anesthesia Evaluation and Assessment Cardiovascular Function/Vital Signs Visit Vitals  /57  Pulse 63  Temp 36.8 °C (98.2 °F)  Resp 17  Ht 6' (1.829 m)  Wt 147.3 kg (324 lb 12.8 oz)  SpO2 100%  BMI 44.05 kg/m2 Patient is status post Procedure(s): RIGHT TOTAL KNEE ARTHROPLASTY. Nausea/Vomiting: Controlled. Postoperative hydration reviewed and adequate. Pain: 
Pain Scale 1: Numeric (0 - 10) (09/26/18 1225) Pain Intensity 1: 0 (09/26/18 1225) Managed. Neurological Status:  
Neuro (WDL): Exceptions to WDL (09/26/18 1225) At baseline. Mental Status and Level of Consciousness: Baseline and stable. Pulmonary Status:  
O2 Device: Nasal cannula (09/26/18 1225) Adequate oxygenation and airway patent. Complications related to anesthesia: None Post-anesthesia assessment completed. No concerns. Patient has met all discharge requirements.  
 
Signed By: Johan Banks MD

## 2018-09-26 NOTE — PROGRESS NOTES
Problem: Mobility Impaired (Adult and Pediatric) Goal: *Acute Goals and Plan of Care (Insert Text) In 1-7 days pt will be able to perform: ST.  Bed mobility:  Rolling L to R to L modified independent for positioning. 2.  Supine to sit to supine S with HR for meals. 3.  Sit to stand to sit S with RW in prep for ambulation. LT.  Gait:  Ambulate >150ft S with RW, WBAT, for home/community mobility. 2.  Activity tolerance: Tolerate up in chair 1-2 hours for ADLs. 3.  Patient/Family Education:  Patient/family to be independent with HEP for follow-up care and safe discharge. physical Therapy EVALUATION Patient: Ana Rosa Bermudez (78 y.o. male) Date: 2018 Primary Diagnosis: RIGHT KNEE OSTEOARTHRITIS Right knee DJD Procedure(s) (LRB): 
RIGHT TOTAL KNEE ARTHROPLASTY (Right) Day of Surgery Precautions:  Fall, WBAT 
 
ASSESSMENT : 
Based on the objective data described below, the patient presents with decreased functional mobility and independence in regard to bed mobility, transfers, gt quality and tolerance, R TKA AROM, R knee strength, pain, stair negotiation and safety due to recent R TKA surgery. Pt rating pain in R knee 2/10 on numerical pain scale. Pt required min A for supine<>sit min A and mod A for sit<>stand w/ elevated bed for success. Pt able to participate in gt training w/ RW, R KI, WBAT, GB and min A w/ antalgic pattern. Pt returned to supine in bed w/ all needs within reach, SCDs applied and ice pack to R knee. Nurse Charity calderon and family present. Recommend Rehab upon hospital d/c. Patient will benefit from skilled intervention to address the above impairments. Patients rehabilitation potential is considered to be Good Factors which may influence rehabilitation potential include:  
[]         None noted 
[]         Mental ability/status []         Medical condition 
[]         Home/family situation and support systems 
[]         Safety awareness [x]         Pain tolerance/management 
[]         Other: PLAN : 
Recommendations and Planned Interventions: 
[x]           Bed Mobility Training             []    Neuromuscular Re-Education 
[x]           Transfer Training                   []    Orthotic/Prosthetic Training 
[x]           Gait Training                          []    Modalities [x]           Therapeutic Exercises          []    Edema Management/Control 
[x]           Therapeutic Activities            [x]    Patient and Family Training/Education 
[]           Other (comment): Frequency/Duration: Patient will be followed by physical therapy 1-2 times per day/4-7 days per week to address goals. Discharge Recommendations: Rehab Further Equipment Recommendations for Discharge: N/A  
 
SUBJECTIVE:  
Patient stated I am going to rehab, I made the doctor mad at me.  OBJECTIVE DATA SUMMARY:  
 
Past Medical History:  
Diagnosis Date  Agent orange exposure  Arthritis   
 osteo  Cancer ([facility] [address]  Diabetes ([facility] [address]  Diabetes mellitus  Edema   
 legs  Essential hypertension  GERD (gastroesophageal reflux disease)  Hypertension  Insomnia  Morbid obesity ([facility] [address]  Neuropathy  Neuropathy 1984  Pituitary adenoma ([facility] [address]  Skin cancer  Unspecified sleep apnea   
 uses CPAP Past Surgical History:  
Procedure Laterality Date  HX ADENOIDECTOMY  HX HEENT    
 sinus, deviated septum repair  HX KNEE REPLACEMENT Left 2014  HX MASTECTOMY Left   
 partial  
 HX ORTHOPAEDIC Left   
 arm- fracture  HX ORTHOPAEDIC Left   
 hardware removal  
 HX OTHER SURGICAL    
 lumps removed from earlobes  HX OTHER SURGICAL    
 numerous lumps removed from various sites  HX OTHER SURGICAL    
 BCC removed  HX TONSILLECTOMY  HX VASECTOMY Barriers to Learning/Limitations: None Compensate with: visual, verbal, tactile, kinesthetic cues/model Prior Level of Function/Home Situation: Home Situation Home Environment: Independent living (elevator) One/Two Story Residence: Two story Living Alone: No 
Support Systems: Friends \ neighbors Patient Expects to be Discharged to[de-identified] Rehabilitation facility Current DME Used/Available at Home: CPAP, Cane, straight, Walker, rolling Critical Behavior: 
Neurologic State: Alert; Appropriate for age Orientation Level: Oriented X4 Cognition: Appropriate decision making; Appropriate for age attention/concentration; Appropriate safety awareness; Follows commands Safety/Judgement: Awareness of environment Psychosocial 
Patient Behaviors: Calm; Cooperative Skin Condition/Temp: Dry;Warm 
Skin Integrity: Incision (comment) (R knee) Skin Integumentary Skin Color: Appropriate for ethnicity Skin Condition/Temp: Dry;Warm 
Skin Integrity: Incision (comment) (R knee) Turgor: Non-tenting Hair Growth: Present Varicosities: Absent Strength:   
Strength: Generally decreased, functional 
Tone & Sensation:  
Tone: Normal 
Sensation: Intact Range Of Motion: 
AROM: Generally decreased, functional 
Functional Mobility: 
Bed Mobility: 
Supine to Sit: Minimum assistance; Additional time (vc) Sit to Supine: Contact guard assistance; Additional time (vc) Scooting: Contact guard assistance; Additional time (vc) Transfers: 
Sit to Stand: Moderate assistance; Additional time (vc; bed elevated) Stand to Sit: Minimum assistance; Additional time (vc; bed elevated) Balance:  
Sitting: Intact Standing: Intact; With support Ambulation/Gait Training: 
Distance (ft): 32 Feet (ft) Assistive Device: Walker, rolling;Gait belt;Brace/Splint Ambulation - Level of Assistance: Contact guard assistance (vc) 
Gait Abnormalities: Antalgic;Decreased step clearance; Step to gait Right Side Weight Bearing: As tolerated Base of Support: Shift to left Stance: Right decreased Speed/Beatrice: Slow Step Length: Left shortened;Right shortened Swing Pattern: Left asymmetrical;Right asymmetrical 
 Interventions: Safety awareness training; Tactile cues; Verbal cues Therapeutic Exercises: HEP written copy issued to pt per MD protocol. Pain: 
Pain Scale 1: Numeric (0 - 10) Pain Intensity 1: 0 Activity Tolerance:  
Fair Please refer to the flowsheet for vital signs taken during this treatment. After treatment:  
[]         Patient left in no apparent distress sitting up in chair 
[x]         Patient left in no apparent distress in bed 
[x]         Call bell left within reach [x]         Nursing notified 
[]         Caregiver present 
[]         Bed alarm activated COMMUNICATION/EDUCATION:  
[x]         Fall prevention education was provided and the patient/caregiver indicated understanding. [x]         Patient/family have participated as able in goal setting and plan of care. [x]         Patient/family agree to work toward stated goals and plan of care. []         Patient understands intent and goals of therapy, but is neutral about his/her participation. []         Patient is unable to participate in goal setting and plan of care. Thank you for this referral. 
Ashutosh Silva, PT Time Calculation: 33 mins Eval Complexity: History: HIGH Complexity :3+ comorbidities / personal factors will impact the outcome/ POC Exam:MEDIUM Complexity : 3 Standardized tests and measures addressing body structure, function, activity limitation and / or participation in recreation  Presentation: MEDIUM Complexity : Evolving with changing characteristics  Clinical Decision Making:Low Complexity amb >30' Overall Complexity:LOW Mobility [id number] Current  CK= 40-59%   Goal  CI= 1-19%. The severity rating is based on the Level of Assistance required for Functional Mobility and ADLs.

## 2018-09-26 NOTE — ROUTINE PROCESS
1940 Bedside and Verbal shift change  Received from Roxana Beaver RN (outgoing nurse), to MALORIE Galeano (oncoming)  Pt. Is AOX 4. IV Patent and infusing well, Pt. denies  pain at this time. Report included the following information SBAR, Kardex, Procedure Summary, Intake/Output, MAR, Recent Lab Results. Will resume care and monitor Pt. Condition. Pt. Educated on call bell when in need of help and assistance. Pt. verbalized understanding. Pt. Head to toe Assessment Done and documented. 2100  Pt. Made no complaints. 2200  Denies discomfort. 0000  Pt. Resting with eyes closed, easily awaken. 0220 Given pain meds per MAR. 
 
0300  Pt. Resting comfortably in bed no sign of distress. 0500  Pt. Sleeping with eyes closed, easily awaken. 5267 Given chlorhexidene bath. 7175  Given tonya per [facility] for pain management. Verbal and bedside Shift changed report given to Neeru Lee RN (oncoming RN) on Pt. Condition. Report consisted of patients Situation, History, Activities, intake/output,  Background, Assessment and Recommendations(SBAR). Information from the following report(s) Kardex, order Summary, Lab results and MAR was reviewed with the receiving nurse. Opportunity for questions and clarification was provided.

## 2018-09-26 NOTE — PROGRESS NOTES
09/26/18 1345 Assessment  Type Assessment Type Admission assessment Reassessment Performed Changes documented below Activity and Safety Activity Level Up with Assistance Activity Assistance Partial (one person) Weight Bearing Status WBAT (Weight Bearing as Tolerated) Mode of Transportation Wheelchair Patient Turned Turns self Head of Bed Elevated Self regulated Assistive Device Fall prevention device;Gait belt;Walker (comment) Safety Measures Bed/Chair-Wheels locked; Bed in low position;Call light within reach; Fall prevention (comment);Gripper socks; Side rails X2 Psychosocial  
Psychosocial (WDL) WDL Purposeful Interaction Yes Pt Identified Daily Priority Clinical issues (comment) Caring Interventions Reassure; Therapeutic modalities Reassure Therapeutic listening;Caring rounds Therapeutic Modalities Humor; Intentional therapeutic touch Patient Behaviors Calm; Cooperative Caritas Process Nurture loving kindness; Teaching/learning Neuro Neurologic State Alert; Appropriate for age Orientation Level Appropriate for age;Oriented X4 Cognition Appropriate decision making; Appropriate for age attention/concentration; Appropriate safety awareness; Follows commands LUE Motor Response Spontaneous LLE Motor Response Weak;Purposeful RUE Motor Response Spontaneous RLE Motor Response Weak;Purposeful RASS Sauer Agitation Sedation Scale (RASS) 0 Target RASS 0 Confusion Assessment Method (CAM) Acute Onset and Fluctuating Course (1A) No  
Confusion Assessment Method-ICU (CAM-ICU) Feature 1: Acute Onset or Fluctuating Course Negative EENT  
EENT (WDL) X Visual Impairment None Visual Aid Glasses; With patient Respiratory Respiratory (WDL) WDL Breath Sounds Bilateral Clear Incentive Spirometry Treatment Level of Service Assisted by nursing Predicted Volume (ml) 1500 ml Actual Volume (ml) 1500 ml  
% Predicted Volume (ml) 1 Cardiac Cardiac (WDL) X  
 B/P Outside of Defined Limits Yes 
(128/53) Cardiac Rhythm NSR;AVB- 1st degree (Reported in pt hx) Heart Sounds S1 S2 Cardiac/Telemetry Monitor On No  
VTE Prophylaxis (Shift Required Documentation) Mechanical VTE Orders Yes Venous Foot Pump Bilateral  
Peripheral Vascular Peripheral Vascular (WDL) WDL  
RLE Peripheral Vascular Capillary Refill Less than/equal to 3 seconds Color Appropriate for race Temperature Warm Sensation Present Pedal Pulse Present;Palpable Abdominal   
Abdominal (WDL) X Last Bowel Movement Date 09/25/18 Genitourinary Genitourinary (WDL) X 
(Pt has not voided) Musculoskeletal  
RLE Surgery;Limited movement;Weakness Dual Skin Pressure Injury Assessment Dual Skin Pressure Injury Assessment WDL Second Care Provider (Based on [address] [place] [address]) Tonny Cooper RN Skin Integumentary Skin Integumentary (WDL) X Skin Color Appropriate for ethnicity Skin Condition/Temp Dry; Warm  
Skin Integrity Incision (comment) 
(R knee) Turgor Non-tenting Hair Growth Present Varicosities Absent Wound Prevention and Protection Methods Orientation of Wound Prevention Posterior Location of Wound Prevention Buttocks Dressing Present  No  
Wound Offloading (Prevention Methods) Bed, pressure redistribution/air;Pillows Neuro Neuro (WDL) WDL Musculoskeletal  
Musculoskeletal (WDL) X  
 
 
1345 - Patient arrives to unit at this time. Dual skin assessment completed with Tonny Cooper RN. Admission completed at this time. Patient is A/O x 4. IV to LH intact and patent. Plexis applied BLE. ACE, Immobilizer, ice pack dressing to R knee CDI. Pt denies numbness/tingling. BLE pulses palpable. Pain 1/10. Patient was oriented to the room to include use of call bell, meal ordering, and use of incentive spirometer patient able to get up to 2000 on IS. Patient was given explanation of \" up for dinner\" program and has verbalized understanding.  Bed in lowest position. Phone and call bell left within reach. Patient educated on need to call for assistance before getting OOB. Plan of care for the day addressed with patient. Educated on pain medication availability and possible side effects. Will continue to monitor. Called RT to assist pt w/ CPAP machine. Pt personal CPAP machine not working. Respiratory provided pt w/ CPAP machine to use while at hospital.  
 
Pt voiding. Pt ambulated w/ PT. Pt using CPAP machine. Bedside and Verbal shift change [facility] by Adria Wilson RN. Report included the following information SBAR, Kardex, OR Summary, Intake/Output and MAR.

## 2018-09-26 NOTE — ANESTHESIA PREPROCEDURE EVALUATION
Anesthetic History No history of anesthetic complications Review of Systems / Medical History Patient summary reviewed, nursing notes reviewed and pertinent labs reviewed Pulmonary Sleep apnea Neuro/Psych Within defined limits Cardiovascular Hypertension GI/Hepatic/Renal 
  
GERD Endo/Other Diabetes Morbid obesity and arthritis Other Findings Physical Exam 
 
Airway Mallampati: II 
TM Distance: 4 - 6 cm Neck ROM: normal range of motion Mouth opening: Normal 
 
 Cardiovascular Regular rate and rhythm,  S1 and S2 normal,  no murmur, click, rub, or gallop Dental 
No notable dental hx Pulmonary Breath sounds clear to auscultation Abdominal 
GI exam deferred Other Findings Anesthetic Plan ASA: 3 Anesthesia type: general 
 
 
Post-op pain plan if not by surgeon: peripheral nerve block single Induction: Intravenous Anesthetic plan and risks discussed with: Family and Patient

## 2018-09-27 PROBLEM — R42 ORTHOSTATIC DIZZINESS: Status: ACTIVE | Noted: 2018-09-27

## 2018-09-27 LAB
ANION GAP SERPL CALC-SCNC: 11 MMOL/L (ref 3–18)
ARTERIAL PATENCY WRIST A: YES
BASE DEFICIT BLD-SCNC: 5 MMOL/L
BDY SITE: ABNORMAL
BUN SERPL-MCNC: 36 MG/DL (ref 7–18)
BUN/CREAT SERPL: 20 (ref 12–20)
CALCIUM SERPL-MCNC: 9.2 MG/DL (ref 8.5–10.1)
CHLORIDE SERPL-SCNC: 103 MMOL/L (ref 100–108)
CO2 SERPL-SCNC: 21 MMOL/L (ref 21–32)
CREAT SERPL-MCNC: 1.79 MG/DL (ref 0.6–1.3)
GAS FLOW.O2 O2 DELIVERY SYS: ABNORMAL L/MIN
GAS FLOW.O2 SETTING OXYMISER: 2 L/M
GLUCOSE BLD STRIP.AUTO-MCNC: 261 MG/DL (ref 70–110)
GLUCOSE BLD STRIP.AUTO-MCNC: 262 MG/DL (ref 70–110)
GLUCOSE BLD STRIP.AUTO-MCNC: 275 MG/DL (ref 70–110)
GLUCOSE BLD STRIP.AUTO-MCNC: 340 MG/DL (ref 70–110)
GLUCOSE SERPL-MCNC: 240 MG/DL (ref 74–99)
HCO3 BLD-SCNC: 20.6 MMOL/L (ref 22–26)
HCT VFR BLD AUTO: 34.9 % (ref 36–48)
HGB BLD-MCNC: 11.3 G/DL (ref 13–16)
O2/TOTAL GAS SETTING VFR VENT: 28 %
PCO2 BLD: 36.9 MMHG (ref 35–45)
PH BLD: 7.36 [PH] (ref 7.35–7.45)
PO2 BLD: 95 MMHG (ref 80–100)
POTASSIUM SERPL-SCNC: 5.1 MMOL/L (ref 3.5–5.5)
SAO2 % BLD: 97 % (ref 92–97)
SERVICE CMNT-IMP: ABNORMAL
SODIUM SERPL-SCNC: 135 MMOL/L (ref 136–145)
SPECIMEN TYPE: ABNORMAL

## 2018-09-27 PROCEDURE — 85018 HEMOGLOBIN: CPT | Performed by: PHYSICIAN ASSISTANT

## 2018-09-27 PROCEDURE — 74011636637 HC RX REV CODE- 636/637: Performed by: HOSPITALIST

## 2018-09-27 PROCEDURE — 80048 BASIC METABOLIC PNL TOTAL CA: CPT | Performed by: PHYSICIAN ASSISTANT

## 2018-09-27 PROCEDURE — 97530 THERAPEUTIC ACTIVITIES: CPT

## 2018-09-27 PROCEDURE — 74011636637 HC RX REV CODE- 636/637: Performed by: ORTHOPAEDIC SURGERY

## 2018-09-27 PROCEDURE — 82962 GLUCOSE BLOOD TEST: CPT

## 2018-09-27 PROCEDURE — 36415 COLL VENOUS BLD VENIPUNCTURE: CPT | Performed by: PHYSICIAN ASSISTANT

## 2018-09-27 PROCEDURE — 74011250636 HC RX REV CODE- 250/636: Performed by: HOSPITALIST

## 2018-09-27 PROCEDURE — 74011250637 HC RX REV CODE- 250/637: Performed by: PHYSICIAN ASSISTANT

## 2018-09-27 PROCEDURE — 36600 WITHDRAWAL OF ARTERIAL BLOOD: CPT

## 2018-09-27 PROCEDURE — 65270000029 HC RM PRIVATE

## 2018-09-27 PROCEDURE — 97167 OT EVAL HIGH COMPLEX 60 MIN: CPT

## 2018-09-27 PROCEDURE — 74011636637 HC RX REV CODE- 636/637: Performed by: PHYSICIAN ASSISTANT

## 2018-09-27 PROCEDURE — 74011250636 HC RX REV CODE- 250/636: Performed by: PHYSICIAN ASSISTANT

## 2018-09-27 PROCEDURE — 82803 BLOOD GASES ANY COMBINATION: CPT

## 2018-09-27 PROCEDURE — 97535 SELF CARE MNGMENT TRAINING: CPT

## 2018-09-27 RX ORDER — HYDROMORPHONE HYDROCHLORIDE 2 MG/1
2-4 TABLET ORAL
Qty: 60 TAB | Refills: 0 | Status: SHIPPED | OUTPATIENT
Start: 2018-09-27

## 2018-09-27 RX ORDER — INSULIN GLARGINE 100 [IU]/ML
30 INJECTION, SOLUTION SUBCUTANEOUS 2 TIMES DAILY
Status: DISCONTINUED | OUTPATIENT
Start: 2018-09-27 | End: 2018-09-28

## 2018-09-27 RX ORDER — ASPIRIN 325 MG
325 TABLET ORAL 2 TIMES DAILY
Qty: 42 TAB | Refills: 0 | Status: SHIPPED | OUTPATIENT
Start: 2018-09-27

## 2018-09-27 RX ORDER — NALOXONE HYDROCHLORIDE 4 MG/.1ML
SPRAY NASAL
Qty: 1 EACH | Refills: 0 | Status: SHIPPED | OUTPATIENT
Start: 2018-09-27

## 2018-09-27 RX ORDER — INSULIN GLARGINE 100 [IU]/ML
30 INJECTION, SOLUTION SUBCUTANEOUS DAILY
Status: DISCONTINUED | OUTPATIENT
Start: 2018-09-27 | End: 2018-09-27

## 2018-09-27 RX ORDER — HYDROMORPHONE HYDROCHLORIDE 2 MG/1
2-4 TABLET ORAL
Status: DISCONTINUED | OUTPATIENT
Start: 2018-09-27 | End: 2018-09-29 | Stop reason: HOSPADM

## 2018-09-27 RX ORDER — INSULIN LISPRO 100 [IU]/ML
0.05 INJECTION, SOLUTION INTRAVENOUS; SUBCUTANEOUS
Status: DISCONTINUED | OUTPATIENT
Start: 2018-09-27 | End: 2018-09-28

## 2018-09-27 RX ORDER — INSULIN GLARGINE 100 [IU]/ML
30 INJECTION, SOLUTION SUBCUTANEOUS 2 TIMES DAILY
Status: DISCONTINUED | OUTPATIENT
Start: 2018-09-28 | End: 2018-09-27

## 2018-09-27 RX ADMIN — OXYCODONE HYDROCHLORIDE 10 MG: 5 TABLET ORAL at 06:17

## 2018-09-27 RX ADMIN — SODIUM CHLORIDE 500 ML: 900 INJECTION, SOLUTION INTRAVENOUS at 21:02

## 2018-09-27 RX ADMIN — INSULIN LISPRO 6 UNITS: 100 INJECTION, SOLUTION INTRAVENOUS; SUBCUTANEOUS at 06:45

## 2018-09-27 RX ADMIN — Medication 1 TABLET: at 09:03

## 2018-09-27 RX ADMIN — ROSUVASTATIN CALCIUM 40 MG: 10 TABLET, FILM COATED ORAL at 22:10

## 2018-09-27 RX ADMIN — ASPIRIN 325 MG ORAL TABLET 325 MG: 325 PILL ORAL at 17:50

## 2018-09-27 RX ADMIN — OMEPRAZOLE 20 MG: 20 CAPSULE, DELAYED RELEASE ORAL at 09:05

## 2018-09-27 RX ADMIN — GABAPENTIN 600 MG: 300 CAPSULE ORAL at 09:03

## 2018-09-27 RX ADMIN — CEFAZOLIN SODIUM 3 G: 10 INJECTION, POWDER, FOR SOLUTION INTRAVENOUS at 00:32

## 2018-09-27 RX ADMIN — HYDROMORPHONE HYDROCHLORIDE 4 MG: 2 TABLET ORAL at 09:05

## 2018-09-27 RX ADMIN — INSULIN LISPRO 6 UNITS: 100 INJECTION, SOLUTION INTRAVENOUS; SUBCUTANEOUS at 12:13

## 2018-09-27 RX ADMIN — LISINOPRIL 40 MG: 20 TABLET ORAL at 09:04

## 2018-09-27 RX ADMIN — ONDANSETRON 4 MG: 4 TABLET, ORALLY DISINTEGRATING ORAL at 12:28

## 2018-09-27 RX ADMIN — INSULIN GLARGINE 30 UNITS: 100 INJECTION, SOLUTION SUBCUTANEOUS at 22:09

## 2018-09-27 RX ADMIN — ASPIRIN 325 MG ORAL TABLET 325 MG: 325 PILL ORAL at 09:03

## 2018-09-27 RX ADMIN — HYDROMORPHONE HYDROCHLORIDE 4 MG: 2 TABLET ORAL at 14:31

## 2018-09-27 RX ADMIN — TAMSULOSIN HYDROCHLORIDE 0.4 MG: 0.4 CAPSULE ORAL at 09:04

## 2018-09-27 RX ADMIN — FUROSEMIDE 40 MG: 40 TABLET ORAL at 09:05

## 2018-09-27 RX ADMIN — ALLOPURINOL 100 MG: 100 TABLET ORAL at 09:04

## 2018-09-27 RX ADMIN — NIFEDIPINE 120 MG: 60 TABLET, FILM COATED, EXTENDED RELEASE ORAL at 09:04

## 2018-09-27 RX ADMIN — INSULIN GLARGINE 30 UNITS: 100 INJECTION, SOLUTION SUBCUTANEOUS at 17:51

## 2018-09-27 RX ADMIN — POTASSIUM CHLORIDE 8 MEQ: 20 SOLUTION ORAL at 09:05

## 2018-09-27 RX ADMIN — INSULIN LISPRO 7 UNITS: 100 INJECTION, SOLUTION INTRAVENOUS; SUBCUTANEOUS at 17:51

## 2018-09-27 RX ADMIN — ALLOPURINOL 100 MG: 100 TABLET ORAL at 17:51

## 2018-09-27 RX ADMIN — Medication 10 ML: at 06:00

## 2018-09-27 RX ADMIN — OXYCODONE HYDROCHLORIDE 10 MG: 5 TABLET ORAL at 02:20

## 2018-09-27 RX ADMIN — INSULIN LISPRO 12 UNITS: 100 INJECTION, SOLUTION INTRAVENOUS; SUBCUTANEOUS at 22:09

## 2018-09-27 RX ADMIN — LOSARTAN POTASSIUM 50 MG: 50 TABLET ORAL at 09:04

## 2018-09-27 RX ADMIN — INSULIN LISPRO 7 UNITS: 100 INJECTION, SOLUTION INTRAVENOUS; SUBCUTANEOUS at 16:30

## 2018-09-27 NOTE — PROGRESS NOTES
Ambulate Mr Monisha Reyna  To the bathroom and he seems confused. Mr. Sindhu Reyna stated to me that he was going outside to do some laundry. I asked patient why he needs to go outside he states that he needs to mow the lawn and start taking his clothes off again. Pt wife states he hasn't eat so he gets confused at time. RN will be notify

## 2018-09-27 NOTE — CONSULTS
Medicine Consult    Patient:  Cameron Grant 68 y.o. male  Asked to evaluate patient by Poonam Spence   Primary Care Provider:  Nadiya Mcclellan MD  Date of Admission:  9/26/2018  Reason for Consult: dizziness         Assessment/Plan     Hospital Problems  Date Reviewed: 9/26/2018          Codes Class Noted POA    Orthostatic dizziness ICD-10-CM: R42  ICD-9-CM: 780.4  9/27/2018 Unknown        Right knee DJD ICD-10-CM: M17.11  ICD-9-CM: 715.96  9/26/2018 Unknown        * (Principal)Primary osteoarthritis of right knee (Chronic) ICD-10-CM: M17.11  ICD-9-CM: 715.16  9/24/2018 Yes        Hypertension (Chronic) ICD-10-CM: I10  ICD-9-CM: 401.9  2/18/2014 Yes        Gastroesophageal reflux disease (Chronic) ICD-10-CM: K21.9  ICD-9-CM: 530.81  2/18/2014 Yes        Diabetes mellitus ([facility] [address] (Chronic) ICD-10-CM: E11.9  ICD-9-CM: 250.00  2/18/2014 Yes        Sleep apnea (Chronic) ICD-10-CM: G47.30  ICD-9-CM: 780.57  2/18/2014 Yes        Hypercholesteremia (Chronic) ICD-10-CM: E78.00  ICD-9-CM: 272.0  2/18/2014 Yes              PLAN:  Orthostatic dizziness   Due to orthostatic hypotension,   Will give 500 ml bolus, recommend to move slowly. So far no neuro deficit and alert and oriented, will continue monitor, will hold ct head for  Now     Confusion   Resolved. Like due to orthostatic hypotension vs pain meds, continue monitoring ,try to avoid narcotics. DM type II   -hyperglycemia . take 82 units lantus twice at home, change to 30 units twice a day  ssi, hypoglycemia protocol , diabetic diet      HTN   Well controlled at home, will continue home medication, check renal function and electrolytes      ckd3   Will check renal function and k level , will d.c K supplement due to ckd with lisinopril and cozaar     m[place] on cpap   Will check abg in case hypercapnia     Rt knee replacement   -Routine postoperative management, pain control.   -DVT prophylaxis per primary team  Incentive spirometry  PT/OT  Supportive care      Thank you for allowing us to participate in  Pt's care and will follow   HPI:   Jeanette Boudreaux is a 68 y.o. male with past medical history significant for milton, obesity. DM, HTN ,OA was admitted to ortho service  for rt knee replacement  Hospital medicine is consulted for dizziness. He was working with cna and got confused while changing position for lying to standing up with pale and reported \"dizziness\". Then pt became alert and oriented, no neuro deficit, he denies chest pain /sob. His glucose was 275. Wife was at the bedside     Past Medical History:   Diagnosis Date    Agent orange exposure     Arthritis     osteo    Cancer ([facility] [address]     [facility]    Diabetes ([facility] [address] [facility]     Diabetes mellitus     Edema     legs    Essential hypertension     GERD (gastroesophageal reflux disease)     Hypertension     Insomnia     Morbid obesity (HCC)     Neuropathy     Neuropathy 1984    Pituitary adenoma ([facility] [address]     Skin cancer     Unspecified sleep apnea     uses CPAP     Past Surgical History:   Procedure Laterality Date    HX ADENOIDECTOMY      HX HEENT      sinus, deviated septum repair    HX KNEE REPLACEMENT Left 2014    HX MASTECTOMY Left     partial    HX ORTHOPAEDIC Left     arm- fracture    HX ORTHOPAEDIC Left     hardware removal    HX OTHER SURGICAL      lumps removed from earlobes    HX OTHER SURGICAL      numerous lumps removed from various sites    HX OTHER SURGICAL      BCC removed    HX TONSILLECTOMY      HX VASECTOMY        Social History   Substance Use Topics    Smoking status: Former Smoker     Packs/day: 1.50     Years: 20.00     Types: Cigarettes     Quit date: 1/1/1996    Smokeless tobacco: Never Used    Alcohol use Yes      Comment: rarely     History reviewed. No pertinent family history. No current facility-administered medications on file prior to encounter.       Current Outpatient Prescriptions on File Prior to Encounter   Medication Sig Dispense Refill    insulin glargine (LANTUS SOLOSTAR) 100 unit/mL (3 mL) pen 82 Units by SubCUTAneous route two (2) times a day. Indications: type 2 diabetes mellitus      psyllium (METAMUCIL) powd Take  by mouth as needed.  lisinopril (PRINIVIL, ZESTRIL) 40 mg tablet Take 40 mg by mouth daily. Indications: hypertension      omeprazole (PRILOSEC) 20 mg capsule Take 20 mg by mouth daily. Pt instructed to take am of surgery. Indications: GASTROESOPHAGEAL REFLUX      potassium chloride SR (KLOR-CON) 8 mEq tablet Take 8 mEq by mouth daily.  rosuvastatin (CRESTOR) 40 mg tablet Take 40 mg by mouth nightly. NON FORMULARY  Indications: HYPERCHOLESTEROLEMIA      furosemide (LASIX) 40 mg tablet Take 40 mg by mouth two (2) times a day. Indications: EDEMA      losartan (COZAAR) 50 mg tablet Take 50 mg by mouth daily. Indications: hypertension      gabapentin (NEURONTIN) 300 mg capsule Take 600 mg by mouth daily. Indications: NEUROPATHIC PAIN      glimepiride (AMARYL) 2 mg tablet Take 4 mg by mouth two (2) times a day. Indications: type 2 diabetes mellitus      NIFEdipine CR (ADALAT CC) 60 mg CR tablet Take 120 mg by mouth daily. Pt instructed to take am of surgery.   Indications: hypertension        No Known Allergies        Review of Systems  Constitutional: No fever, chills, diaphoresis, malaise, fatigue or weight gain/loss or falls  Skin: no itching or rashes  HEENT: no ear discomfort, hearing loss, tinnitus, epistaxis or sore throat  EYES: no blurry vision, double vision or photophobia  CARDIOVASCULAR: no claudication, cp, palpitations, orthopnea, pnd or LE edema  PULMONARY: no cough, wheeze, shortness of breath or sputum production  GI: no nausea, vomiting, diarrhea, abdominal pain, melena, hematemesis or brbpr  : no dysuria, hematuria  MUSCULOSKELETAL: no back pain, joint pain or myalgias  ENDOCRINE: no heat/cold intolerance, polyuria or polydipsia  HEME: no easy bruising or bleeding  NEURO: no unilateral weakness, numbness, tingling or seizures, dizziness       Physical Exam:      Visit Vitals    /54 (BP Patient Position: Standing)    Pulse 97    Temp 98.7 °F (37.1 °C)    Resp 15    Ht 6' (1.829 m)    Wt 147.3 kg (324 lb 12.8 oz)    SpO2 100%    BMI 44.05 kg/m2     Body mass index is 44.05 kg/(m^2).     Physical Exam:  GEN: well nourished, laying in bed in no acute distress  HEENT: atraumatic, nose normal,oropharynx clear, MMM  NECK: supple, trachea midline, no supraclavicular or submandibular adenopathy noted  EYES: conjuctiva normal, lids with out lesions, PERRL  HEART: RRR with out m/r/g, pmi nondisplaced, pulses 2+ distally  LUNGS: equal chest wall expansion, cta bl with out wheezes/rales or rhonchi  AB: soft, +BS, nt/nd no organomegaly  NEURO: alert, awake and oriented x3, gait not assessed, cranial nerves intact, strength 5/5 bl UE and LE, sensation intact, reflexes nonpathological. Rt knee wrapped with gauze   SKIN: dry, intact, warm no breakdown noted        Laboratory Studies:    BMP:   Lab Results   Component Value Date/Time     (L) 09/27/2018 05:25 AM    K 5.1 09/27/2018 05:25 AM     09/27/2018 05:25 AM    CO2 21 09/27/2018 05:25 AM    AGAP 11 09/27/2018 05:25 AM     (H) 09/27/2018 05:25 AM    BUN 36 (H) 09/27/2018 05:25 AM    CREA 1.79 (H) 09/27/2018 05:25 AM    GFRAA 45 (L) 09/27/2018 05:25 AM    GFRNA 37 (L) 09/27/2018 05:25 AM     CMP:   Lab Results   Component Value Date/Time     (L) 09/27/2018 05:25 AM    K 5.1 09/27/2018 05:25 AM     09/27/2018 05:25 AM    CO2 21 09/27/2018 05:25 AM    AGAP 11 09/27/2018 05:25 AM     (H) 09/27/2018 05:25 AM    BUN 36 (H) 09/27/2018 05:25 AM    CREA 1.79 (H) 09/27/2018 05:25 AM    GFRAA 45 (L) 09/27/2018 05:25 AM    GFRNA 37 (L) 09/27/2018 05:25 AM    CA 9.2 09/27/2018 05:25 AM     CBC:   Lab Results   Component Value Date/Time    HGB 11.3 (L) 09/27/2018 05:25 AM    HCT 34.9 (L) 09/27/2018 05:25 AM     All Cardiac Markers in the last 24 hours: No results found for: CPK, CK, CKMMB, CKMB, RCK3, CKMBT, CKNDX, CKND1, DANNIELLE, TROPT, TROIQ, SUKHJINDER, TROPT, TNIPOC, BNP, BNPP  Recent Glucose Results:   Lab Results   Component Value Date/Time     (H) 09/27/2018 05:25 AM     ABG:   Lab Results   Component Value Date/Time    PHI 7.356 09/27/2018 06:57 PM    PCO2I 36.9 09/27/2018 06:57 PM    PO2I 95 09/27/2018 06:57 PM    HCO3I 20.6 (L) 09/27/2018 06:57 PM    FIO2I 28 09/27/2018 06:57 PM     COAGS: No results found for: APTT, PTP, INR  Liver Panel: No results found for: ALB, CBIL, TBIL, TP, GLOB, AGRAT, SGOT, ASTPOC, ALTPOC, ALT, GPT, AP  Pancreatic Markers: No results found for: AMYLPOCT, AML, LIPPOCT, LPSE    Imaging studies personally reviewed:          Wyatt Chandler MD, Internal Medicine

## 2018-09-27 NOTE — PROGRESS NOTES
Progress Note Patient: Sunny Anderson               Sex: male          DOA: 9/26/2018 YOB: 1945      Age:  68 y.o.        LOS:  LOS: 1 day Status Post: Procedure(s): RIGHT TOTAL KNEE ARTHROPLASTY Surgery Date: 9/26/2018 Subjective:  
 
Sunny Anderson is a 68 y.o. male without c/o nausea. He does have pain. Patient denies any complaint of calf pain/ SOB or difficulty breathing. Objective:  
  
Visit Vitals  /64  Pulse 69  Temp 98 °F (36.7 °C)  Resp 15  Ht 6' (1.829 m)  Wt 147.3 kg (324 lb 12.8 oz)  SpO2 96%  BMI 44.05 kg/m2 Physical Exam:  
Dressing:  clean, dry, intact Wiggles Toes/Ankle Foot sensation intact to light touch No foot edema/ +1 Posterior Tibial Pulse Calf soft, supple and non tender. Negative Homans sign. Intake and Output: 
Current Shift:  09/26 1901 - 09/27 0700 In: -  
Out: 2113 [FNYUW:0350] Last three shifts:  09/25 0701 - 09/26 1900 In: 2060 [P.O.:240; I.V.:1820] Out: 300 [Urine:150] Voiding Status:  + void without need for Rojas catheter Lab/Data Reviewed: 
Recent Labs  
   09/27/18 
 0064 HGB  11.3* HCT  34.9* NA  135*  
K  5.1 BUN  36* CREA  1.79* GLU  240* Medications Reviewed Assessment/Plan Principal Problem: 
  Primary osteoarthritis of right knee (9/24/2018) Active Problems: Hypertension (2/18/2014) Gastroesophageal reflux disease (2/18/2014) Diabetes mellitus ([facility] [address] (2/18/2014) Sleep apnea (2/18/2014) Hypercholesteremia (2/18/2014) Right knee DJD (9/26/2018) · Change IV to Saline Lock. · Discharge Planning for home, probable rehab. · Begin DVT Prophylaxis - Aspirin 325 mg twice daily · Continue PT WBAT, ROM as tolerated. Continue with aggressive ROM exercises hourly using the physical therapy exercises sheet. · Discharge home today, if he clears PT. · Switched to Dilaudid. The patient was seen and examined by Dr. Federico Cooper today as well and he is in agreement with above.

## 2018-09-27 NOTE — PROGRESS NOTES
09/27/18 8490 Assessment  Type Assessment Type Shift assessment Reassessment Performed Changes documented below Activity and Safety Activity Level Up with Assistance Activity In bed;Resting quietly Activity Assistance Partial (one person) Weight Bearing Status WBAT (Weight Bearing as Tolerated) Mode of Transportation Wheelchair Patient Turned Turns self Head of Bed Elevated Self regulated Assistive Device Fall prevention device;Gait belt;Walker (comment) Safety Measures Bed/Chair-Wheels locked; Bed in low position;Call light within reach; Fall prevention (comment);Gripper socks; Side rails X2 Psychosocial  
Psychosocial (WDL) WDL Purposeful Interaction Yes Pt Identified Daily Priority Clinical issues (comment) Caring Interventions Reassure; Therapeutic modalities Reassure Therapeutic listening;Caring rounds Therapeutic Modalities Humor; Intentional therapeutic touch Patient Behaviors Calm; Cooperative; Appropriate for age Caritas Process Nurture loving kindness; Teaching/learning Neuro Neurologic State Alert; Appropriate for age Orientation Level Oriented X4;Appropriate for age Cognition Appropriate decision making; Appropriate for age attention/concentration; Appropriate safety awareness; Follows commands LUE Motor Response Spontaneous LLE Motor Response Purposeful RUE Motor Response Spontaneous RLE Motor Response Weak;Purposeful RASS Sauer Agitation Sedation Scale (RASS) 0 Target RASS 0 Confusion Assessment Method (CAM) Acute Onset and Fluctuating Course (1A) No  
Confusion Assessment Method-ICU (CAM-ICU) Feature 1: Acute Onset or Fluctuating Course Negative EENT  
EENT (WDL) WDL Visual Impairment None Visual Aid Glasses; With patient Respiratory Respiratory (WDL) WDL Breath Sounds Bilateral Clear Incentive Spirometry Treatment Level of Service Needs encouragement Predicted Volume (ml) 1500 ml Actual Volume (ml) 1200 ml % Predicted Volume (ml) 0.8 Cardiac Cardiac (WDL) X  
B/P Outside of Defined Limits Yes 
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  
VTE Prophylaxis (Shift Required Documentation) Mechanical VTE Orders Yes Venous Foot Pump Bilateral  
Peripheral Vascular Peripheral Vascular (WDL) WDL  
RLE Peripheral Vascular Capillary Refill Less than/equal to 3 seconds Color Appropriate for race Temperature Warm Sensation Present Pedal Pulse Present;Palpable Abdominal   
Abdominal (WDL) WDL Last Bowel Movement Date 09/25/18 Abdominal Assessment Obese Genitourinary Genitourinary (WDL) WDL Urine Assessment Urinary Status Voiding Musculoskeletal  
RLE Surgery;Limited movement;Weakness Skin Integumentary Skin Integumentary (WDL) X Skin Color Appropriate for ethnicity Skin Condition/Temp Dry; Warm  
Skin Integrity Incision (comment) 
(R knee) Turgor Non-tenting Hair Growth Present Varicosities Absent Pressure  Injury Documentation No Pressure Injury Noted-Pressure Ulcer Prevention Initiated Wound Prevention and Protection Methods Orientation of Wound Prevention Posterior Location of Wound Prevention Buttocks Dressing Present  No  
Wound Offloading (Prevention Methods) Bed, pressure reduction mattress;Pillows Neuro Neuro (WDL) WDL Musculoskeletal  
Musculoskeletal (WDL) X  
 
 
 
 
0805 Walked in pt room. Pt trying to sit up to side of bed. Pt stated he needed to use the restroom. Assisted pt to side of bed. Placed walker in front of pt. Instructed pt to  \"push off\" of bed w/ one hand and place one hand on walker. Pt had difficulty standing up. After several tries, pt stood up w/ walker. Pt had difficulty ambulating. Assisted pt in pivoting to chair. Instructed pt to \"sit in chair' and \"do not attempt to get up\". SILVER Sánchez and [place], [place] assisted this RN w/ getting pt to [facility].  Pt unable to have BM. BAR Santana and this RN assisted pt back to bed. Call from [place], diabetes educator. She would like order from Ortho  for Lantus to be added to pt medication admin. Paged Florina NOVA. Informed PA of Milagros's order request. Florina NOVA agreed w/ request. 
 
Montez Wright paged about confirmation for request. Lantus on MAR. 
 
1600 Call from [facility]. Gene RN in pt room w/ [facility] CNA. CNA stated pt seemed confused while  ambulating to bathroom. Pt now sitting to side of bed. Gene RN assessing VS. Visit Vitals  /44  Pulse 77  Temp 98.7 °F (37.1 °C)  Resp 15  Ht 6' (1.829 m)  Wt 147.3 kg (324 lb 12.8 oz)  SpO2 98%  BMI 44.05 kg/m2  
 
1610 BP assessed again 79/36. This RN assessed pt , face symmetry, peripheral vision, HR, O2 sats, VS, mental status. Pt could focus eyes on me, smile symmetrical, peripheral vision intact,  equal/strong. Pt stated I was his nurse. Pt slightly diaphoretic and pale. Pt stated he felt dizzy. Called Nikkie, PT into the room. Shaunna Banuelos RN also in room. Pt assisted to his side on the bed and rolled to his back. Pt assisted in boosting himself in bed while lying flat. Pt color improved. Pt stated he felt better. Gene RN assessed VS again. 102/45,97% 2 L, 85, 12, 97.5 oral.  
 
1640 Paged [phone] Hospitalist. Hospitalist will be Dr Quan Bueno. Order placed for Hospitalist. Spoke to Dr Quan Bueno via phone. Gave Dr Quan uBeno report of incident written above, VS, pt current condition, blood glucose level. Dr Quan Bueno requested Orthostatics to be preformed and stated she would look over pt chart and come to unit and assess pt. serene Rm on floor to provide second IV site. Kirkwood Holter was unable to obtain site after two tries. Dr Quan Bueno ordered ABGs to be drawn. This RN called RT. RT made aware of ABG order. RT stated she would come to unit now. Orthostatics ordered and preformed. See flow sheet. 500 mL/hr NS bolus ordered. This RN in room to administer bolus. IV has infiltrated. Fluids stopped. Nursing Supervisor, Kaylyn Robins called to ask ICU RN to place new IV. [facility] stated she would get someone. Pt informed new site will be obtained soon. Informed by RT, pt ABG results not critical. Results are \"low side of normal\" more acidotic but in normal range. Call from Florina NOVA for update on pt condition. Informed Florina of no further incidents and Dr Masoud Crowe orders. Bedside and Verbal shift change report given to [address] [place] [address] by Elio Cordero RN. Report included the following information SBAR, Kardex, OR Summary, Intake/Output and MAR.

## 2018-09-27 NOTE — PROGRESS NOTES
Pt resting comfortably on hospital-issued cpap in auto-titration mode on 21% fio2. No distress noted. Will continue to monitor.

## 2018-09-27 NOTE — DIABETES MGMT
GLYCEMIC CONTROL SCREENING INITIATED: 
-known h/o T2DM, HbA1c within recommended range for age + comorbids on basal + sulf home regimen 
-TDD = 16 units - Humalog Normal Insulin Sensitivity Corrective Coverage 
-both FBG & PPG out of target range, recommend initiate basal/bolus subq regimen (RN on floor to page Ortho) *Lantus 30 units daily *Humalog 7 units qac - Humalog Very Insulin Resistant Corrective Coverage Addendum: 
-pt reports last dose of Lantus was the day prior to surgery 9/25; reports adherence to basal regimen of Lantus 82 units BID  (per pt PCP wants to increase to 90 BID) -denies hypos @ home 
-above recommendations for basal/bolus subq regimen appropriate Addendum: 
-above orders entered with permission from [employer] [facility] Lab Results Component Value Date/Time Hemoglobin A1c 6.7 (H) 09/12/2018 02:10 PM  
  
Lab Results Component Value Date/Time Glucose 240 (H) 09/27/2018 05:25 AM  
 
 
  [x]  Glucose values exceeding inpatient target range: -180mg/dl   
        non-ICU 70-140mg/dl []  Patient meets criteria for pre-diabetes   HbA1c = 5.7-6.4% [x]  Patient has h/o diabetes HbA1c ? 6.5% 
 
  []  Recommend discontinuing oral anti-diabetic medications until discharge. [x]  Recommend weight based basal insulin per IP Insulin order set. *Lantus 30 units daily [x]  Recommend weight based meal time insulin per IP Insulin order set. *Humalog 7 units qac [x]  Recommend corrective insulin per IP Insulin order set (orders entered per protocol) []  Standard insulin scale  i[x]  Very insuln resistant scale 
  
  []  Patient meets criteria for IV Insulin Infusion/GlucoStabilizer order set. []  Patient has had a hypoglycemic event, recommend 
 
  [x]  Recommend continue blood glucose monitoring. []  Patient will need prescription for glucometer, test strips and lancets. [x]  Recommend continue carbohydrate controlled diabetic diet. []  Diabetes Self-Management class schedule provided and patient encouraged to attend. [] Other Will Pittman MS, RN, CDE Glycemic Control Team 
[phone] Pager [phone] (M-TH 8:30-5P) *After Hours pager [phone]

## 2018-09-27 NOTE — PROGRESS NOTES
Problem: Mobility Impaired (Adult and Pediatric) Goal: *Acute Goals and Plan of Care (Insert Text) In 1-7 days pt will be able to perform: ST.  Bed mobility:  Rolling L to R to L modified independent for positioning. 2.  Supine to sit to supine S with HR for meals. 3.  Sit to stand to sit S with RW in prep for ambulation. LT.  Gait:  Ambulate >150ft S with RW, WBAT, for home/community mobility. 2.  Activity tolerance: Tolerate up in chair 1-2 hours for ADLs. 3.  Patient/Family Education:  Patient/family to be independent with HEP for follow-up care and safe discharge. Outcome: Progressing Towards Goal 
physical Therapy TREATMENT Patient: Jazmyn Pike (78 y.o. male) Date: 2018 Diagnosis: RIGHT KNEE OSTEOARTHRITIS Right knee DJD Primary osteoarthritis of right knee Procedure(s) (LRB): 
RIGHT TOTAL KNEE ARTHROPLASTY (Right) 1 Day Post-Op Precautions: Fall, WBAT (R TKA) Chart, physical therapy assessment, plan of care and goals were reviewed. ASSESSMENT: 
Pt attempting to get self back to bed with spouse upon entering room. Pt is slow to respond to questions and directions. Rehab tech assisted with TF from chair to bed. Rocking tech utilized to assist with sit to stand from low seat. ModAx2 required and increased VC for tech. Increased difficulty following directions for gt sequence, Melida needed for RW mgmt. ModA for sit to supine. Supine RLE ROM strengthening exercises performed, 10 reps each. Education:UE placement for sit<>stand, rocking tech, ROM TE  
Progression toward goals: 
[]      Improving appropriately and progressing toward goals [x]      Improving slowly and progressing toward goals 
[]      Not making progress toward goals and plan of care will be adjusted PLAN: 
Patient continues to benefit from skilled intervention to address the above impairments. Continue treatment per established plan of care. Discharge Recommendations:  Elmer Lino Further Equipment Recommendations for Discharge: Wide base RW SUBJECTIVE:  
Patient stated mild.  (when asked to rate pain) OBJECTIVE DATA SUMMARY:  
Critical Behavior: 
Neurologic State: Alert Orientation Level: Oriented X4 Cognition: Decreased attention/concentration, Decreased command following, Impaired decision making, Poor safety awareness Safety/Judgement: Decreased insight into deficits, Awareness of environment Functional Mobility Training: 
Bed Mobility: 
Sit to Supine: Minimum assistance; Moderate assistance; Additional time Scooting: Contact guard assistance;Minimum assistance;Maximum assistance; Additional time Transfers: 
Sit to Stand: Moderate assistance;Assist x2 Stand to Sit: Minimum assistance Bed to Chair: Minimum assistance; Adaptive equipment; Additional time (constant cues) Balance: 
Sitting: Intact Standing: Impaired; With support Standing - Static: Fair Standing - Dynamic : Fair (-) Ambulation/Gait Training: 
Distance (ft): 5 Feet (ft) Assistive Device: Gait belt;Walker, rolling Ambulation - Level of Assistance: Minimal assistance;Assist x2 Gait Abnormalities: Antalgic;Decreased step clearance; Step to gait Right Side Weight Bearing: As tolerated Speed/Beatrice: Delayed Step Length: Right shortened;Left shortened Swing Pattern: Right asymmetrical 
GCODE:Mobility [id number] Current  CK= 40-59%   Goal  CI= 1-19%. The severity rating is based on the Level of Assistance required for Functional Mobility and ADLs. Therapeutic Exercises:  
QS, HS 10x ea Pain: 
Pre:3 Post:3 Pain Scale 1: Numeric (0 - 10) Pain Location 1: Knee Pain Orientation 1: Right Pain Description 1: Aching Activity Tolerance:  
Poor Please refer to the flowsheet for vital signs taken during this treatment. After treatment:  
[] Patient left in no apparent distress sitting up in chair 
[x] Patient left in no apparent distress in bed [x] Call bell left within reach 
[] Nursing notified 
[] Caregiver present 
[] Bed alarm activated Charu Kruse PTA Time Calculation: 19 mins

## 2018-09-27 NOTE — DISCHARGE INSTRUCTIONS
Daren Nam III, MD Alfredo Ahmadi, PA-C    Lower Extremity Surgery  Discharge Instructions      Please take the time to review the following instructions before you leave the hospital and use them as guidelines during your recovery from surgery. If you have any questions you may contact my office at [phone]. Wound Care/Dressing Changes:    [x]   You may change your dressing as needed. Beginning the 2 days after you are discharged from the hospital you can change your dressing daily. A big, bulky dressing isn't necessary as long as there isn't any drainage from the incisions. You will be shown how to change the dressings properly by the home health aids as well. There will be a piece of tape over your incision that resembles gauze. This tape should stay on and you should not attempt to remove it. Once you begin to get this wet in the shower this will begin to fall off on its own, although it will take days. Do not apply antibiotic ointment to your incisions. Showering/Bathing:    [x]   You may shower 2 days after surgery. Your dressing may be removed for showering. You may get your incisions wet in the shower. Don't vigorously scrub the area where your incisions are. Please pat dry the incision. Apply a clean, dry dressing after drying off the area of your incisions. Don't take a tub bath, get in a swimming pool or Jacuzzi until the incisions are completely healed, and you are seen in the office by Dr. Robbie Washington. Do not soak your incisions under water. []   Do not get the dressing wet. Once you remove it two days from surgery, you may get the incision wet if there is no drainage. Weight Bearing Status/Braces/Activity:    [x]   If you have had a total knee replacement you may weight bear as tolerated with the knee immobilizer in place. Use crutches, cane, or walker as needed. You should sleep in your knee immobilizer.   You need to begin working on range of motion early after your surgery. It is very important to work on extension (straighthening) the knee. You will be advanced with walking and range of motion by physical therapy at home.     []   If you have had a total hip replacement you may weight bear as tolerated. Physical therapy with come by your house to help you perform exercises and work on strength and range of motion. Ice/Elevation:    Continue ice and elevation consistently for 48 hours after surgery. If you have had a total knee replacement when elevating your knee elevate it on about 4 pillows to be sure it is above your heart. After 48 hours, you should ice your knee 3 times per day, for 20 minutes at a time for the next 5 days. After one week from surgery, you may use ice and elevation as needed for pain and swelling. Diet:    You may advance to your regular diet as tolerated. Medication:    1. You will be given a prescription for pain medications when you are discharged from the hospital.  Take the medication as needed according to the directions on the prescription bottle. Possible side effects of the medication include dizziness, headache, nausea, vomiting, constipation and urinary retention. If you experience any of these side effects call the office so that we can assist you in relieving them. Discontinue the use of the pain medication if you develop itching, rash, shortness of breath or difficulties swallowing. If these symptoms become severe or aren't relieved by discontinuing the medication you should seek immediate medical attention. Refills of pain medication are authorized during office hours only (8AM - 5PM Mon thru Fri). 2. If you were prescribed Percocet/oxycodone or Dilaudid/hydromorphone you must have a written prescription. These medications legally cannot be called in to a pharmacy. 3. Do not take Tylenol in addition to your pain medication as most of the pain medication already contains Tylenol.   Exceptions include Dilaudid/hydromorphone, Demerol/meperidine and roxicodone. Do not exceed 3000 mg of Tylenol per day. Ex:  (hydrocodone 5/325mg = 325 mg of Tylenol)  4. You should use Aspirin 81 mg twice daily for 21 days from the date of your surgery. This will help to prevent blood clots from forming in your legs. This needs to be started the day after your surgery and home healthcare will teach you how this is done. If you are taking another medication such as Xarelto as discussed with Dr. Gabriele Henry this should also be started the day after the surgery. 5. You may resume the medications you were taking prior to your surgery, unless otherwise specified in your discharge instructions. Pain medication may change the effects of any antidepressant medication. If you have any questions about possible interactions between your regular medications and the pain medication you should consult the physician who prescribes your regular medications. 6. If you had a total joint as an outpatient procedure and did not spend the night in the hospital, Dr. Gabriele Henry has written for an oral antibiotic that you should take as instructed. This antibiotic is generally Keflex or Clindamycin. If you spent the night in the hospital the antibiotic was given to you through the IV and there is nothing else to take orally. Follow Up Appointment:    If you are unsure of your follow-up appointment date and time, please call [phone]. Please let our  know you are scheduling a post-op appointment. Most appointments should be between 7-14 days after your surgery. Physical Therapy:    [x]   Physical therapy will be discussed with you at your first follow-up appointment with Dr. Gabriele Henry. You don't need to begin physical therapy prior to that visit. You are to participate with [address] [place] [address] as arranged pre-operatively in the convience of your own home.     Important signs and symptoms:    If any of the following signs and symptoms occurs, you should contact Dr. Kevin Decker' office. Please be advised if a problem arises which you feel required immediate medical attention or your are unable to contact Dr. Kevin Decker' office you should seek immediate medical attention. Signs and symptoms to watch for include:  1. A sudden increase in swelling and/or redness or warmth at the area your surgery was performed which isn't relieved by rest, ice and elevation. 2. Oral temperature greater than 101.5 degrees for 12 hours or more which isn't relieved by an increase in fluid intake and taking two Tylenol every 4-6 hours. Do not exceed 3000 mg of Tylenol per day. 3. Excessive drainage from your incisions or drainage that hasn't stopped by 72 hours. 4. Calf pian, tenderness, redness or swelling which isn't relieved with rest and elevation. 5. Fever, chills, shortness of breath, chest pain, nausea, vomiting or other signs and symptoms which are of concern to you.

## 2018-09-27 NOTE — ROUTINE PROCESS
2556 - Bedside report received from MORENITA Woodruff. Patient in bed at this time. Pain 7/10. Plan of care for the day addressed with the patient. Will continue to monitor.

## 2018-09-27 NOTE — PROGRESS NOTES
Problem: Self Care Deficits Care Plan (Adult) Goal: *Acute Goals and Plan of Care (Insert Text) Initial Occupational Therapy Goals (9/27/2018) Within 7 day(s): 1. Patient will perform grooming standing sinkside with CGA for increased independence in ADLs. 2. Patient will perform UB dressing with setup seated EOB for increased independence with ADLs. 3. Patient will perform LB dressing with Melida & A/E PRN for increased independence with ADLs. 4. Patient will perform all aspects of toileting with CGA for increased independence with ADLs. 5. Patient will perform LB ADLs utilizing body mechanics & adaptive strategies with 1 verbal cue for increased safety in ADLs. 6. Patient will independently apply energy conservation techniques with 1 verbal cue(s)for increased independence with ADLs. Outcome: Progressing Towards Goal 
Occupational Therapy EVALUATION Patient: Brenda Dubin (78 y.o. male) Date: 9/27/2018 Primary Diagnosis: RIGHT KNEE OSTEOARTHRITIS Right knee DJD Procedure(s) (LRB): 
RIGHT TOTAL KNEE ARTHROPLASTY (Right) 1 Day Post-Op Precautions:  Fall, WBAT (R TKA) ASSESSMENT : 
Based on the objective data described below, the patient presents with decreased functional strength, decreased functional balance, decreased overall activity tolerance limiting independence with ADLs. Patient in chair w/ RN/CNA reporting unable to get pt out of chair for toileting. Pt greatly limited by BUE strength and habitus to transition from sit/stand. Pt also w/ difficulty w/ confusion w/ step sequence requiring tactile cue on leg to move. Pt assisted to [facility] w/ Melida and constant cues for motor planning and problem solving for step sequence. Pt max/total assist for clothing mgmt and hygiene. Pt again requiring significant assist into stand and instructed on side stepping towards bed w/ pt initially stepping backwards, but then attempting to step away from bed.  Pt instructed to reach back to sit on bed and pt reporting he was falling. Pt assisted w/ UE dressing and assisted back to bed w/ constant cues to right self in center of bed. Pt would benefit from continued OT services to maximize independence & safety. Education: Reviewed home safety, body mechanics, importance of moving every hour to prevent joint stiffness, role of ice for edema/pain control, Rolling Walker management/safety, and adaptive dressing techniques with patient verbalizing  understanding at this time Patient will benefit from skilled intervention to address the above impairments. Patients rehabilitation potential is considered to be Fair Factors which may influence rehabilitation potential include:  
[]             None noted [x]             Mental ability/status [x]             Medical condition []             Home/family situation and support systems []             Safety awareness [x]             Pain tolerance/management 
[]             Other: PLAN : 
Recommendations and Planned Interventions: 
[x]               Self Care Training                  [x]        Therapeutic Activities [x]               Functional Mobility Training    [x]        Cognitive Retraining 
[x]               Therapeutic Exercises           [x]        Endurance Activities [x]               Balance Training                   [x]        Neuromuscular Re-Education [x]               Visual/Perceptual Training     [x]   Home Safety Training 
[x]               Patient Education                 [x]        Family Training/Education []               Other (comment): Frequency/Duration: Patient will be followed by occupational therapy 1-2 times per day/2-4 days per week to address goals. Discharge Recommendations: Rehab Further Equipment Recommendations for Discharge: To Be Determined (TBD) at next level of care SUBJECTIVE:  
Patient stated I'm moving across the street.  (When asked if he had considered Rehab, pt stated no, but then responded he was moving across the street. When asked if he was going to [address] [place] [address], pt verbalized he was. Pt educated that [facility] was \"Rehab\") OBJECTIVE DATA SUMMARY:  
 
Past Medical History:  
Diagnosis Date  Agent orange exposure  Arthritis   
 osteo  Cancer ([facility] [address]  Diabetes ([facility] [address]  Diabetes mellitus  Edema   
 legs  Essential hypertension  GERD (gastroesophageal reflux disease)  Hypertension  Insomnia  Morbid obesity ([facility] [address]  Neuropathy  Neuropathy 1984  Pituitary adenoma ([facility] [address]  Skin cancer  Unspecified sleep apnea   
 uses CPAP Past Surgical History:  
Procedure Laterality Date  HX ADENOIDECTOMY  HX HEENT    
 sinus, deviated septum repair  HX KNEE REPLACEMENT Left 2014  HX MASTECTOMY Left   
 partial  
 HX ORTHOPAEDIC Left   
 arm- fracture  HX ORTHOPAEDIC Left   
 hardware removal  
 HX OTHER SURGICAL    
 lumps removed from earlobes  HX OTHER SURGICAL    
 numerous lumps removed from various sites  HX OTHER SURGICAL    
 BCC removed  HX TONSILLECTOMY  HX VASECTOMY Barriers to Learning/Limitations: yes;  emotional, cognitive and physical 
Compensate with: visual, verbal, tactile, kinesthetic cues/model Prior Level of Function/Home Situation: Pt reports living w/ spouse? In apartment Home Situation Home Environment: Independent living (elevator) One/Two Story Residence: Two story Living Alone: No 
Support Systems: Friends \ neighbors Patient Expects to be Discharged to[de-identified] Rehabilitation facility Current DME Used/Available at Home: CPAP, Cane, straight, Walker, rolling 
[x]  Right hand dominant   []  Left hand dominant Cognitive/Behavioral Status: 
Neurologic State: Alert Orientation Level: Oriented X4 Cognition: Decreased attention/concentration;Decreased command following; Impaired decision making;Poor safety awareness Safety/Judgement: Decreased insight into deficits; Awareness of environment Skin: R knee incision w/ dressing Edema: ACE in place & applied ice Vision/Perceptual:   
 limited visual scanning likely d/t cognition Coordination: 
Coordination: Within functional limits Fine Motor Skills-Upper: Left Intact; Right Intact Gross Motor Skills-Upper: Left Intact; Right Intact Balance: 
Sitting: Intact Standing: Impaired; With support Strength: 
Strength: Generally decreased, functional 
Tone & Sensation: 
Tone: Normal 
Sensation: Intact Range of Motion: 
AROM: Generally decreased, functional 
PROM: Generally decreased, functional 
 
Functional Mobility and Transfers for ADLs: 
Bed Mobility: 
Sit to Supine: Minimum assistance; Additional time (constant cues) Scooting: Contact guard assistance;Minimum assistance;Maximum assistance; Additional time Transfers: 
Sit to Stand: Maximum assistance;Assist x2;Adaptive equipment Bed to Chair: Minimum assistance; Adaptive equipment; Additional time (constant cues) Toilet Transfer : Minimum assistance; Additional time; Adaptive equipment (constant cues) ADL Assessment:  
Feeding: Setup Oral Facial Hygiene/Grooming: Moderate assistance Bathing: Maximum assistance Upper Body Dressing: Minimum assistance Lower Body Dressing: Maximum assistance; Total assistance Toileting: Maximum assistance ADL Intervention: 
Grooming Washing Hands: Supervision/set-up (hand wipes) Upper Body Dressing Assistance Hospital Gown: Maximum assistance Lower Body Dressing Assistance Socks: Total assistance (dependent) Position Performed: Seated edge of bed Toileting Toileting Assistance: Maximum assistance Bladder Hygiene: Maximum assistance Bowel Hygiene: Total assistance (dependent) Clothing Management: Total assistance (dependent) Cues: Physical assistance for pants down;Physical assistance for pants up;Tactile cues provided;Verbal cues provided;Visual cues provided Adaptive Equipment: Walker [facility]) Cognitive Retraining Problem Solving: Inductive reason; Identifying the task; Identifying the problem;General alternative solution;Deductive reason; Awareness of environment Executive Functions: Managing time; Executing cognitive plans;Regulating behavior Organizing/Sequencing: Breaking task down;Prioritizing Attention to Task: Distractibility; Single task Maintains Attention For (Time): 30 seconds Following Commands: Awareness of environment; Follows one step commands/directions Safety/Judgement: Decreased insight into deficits; Awareness of environment Cues: Tactile cues provided;Verbal cues provided;Visual cues provided Pain: 
Pre-treatment: 5/10 Post-treatment: 5/10 Activity Tolerance:  
Patient able to stand <1 minute(s). Patient able to complete ADLs with frequent rest breaks. Patient limited by pain/cognition/strength/ habitus/balance. Patient unsteady Please refer to the flowsheet for vital signs taken during this treatment. After treatment:  
[] Patient left in no apparent distress sitting up in chair 
[x] Patient left in no apparent distress in bed 
[x] Call bell left within reach [x] Nursing notified/melvina 
[] Caregiver present 
[] Bed alarm activated COMMUNICATION/EDUCATION:  
[x] Home safety education was provided and the patient/caregiver indicated understanding. [x] Patient/family have participated as able in goal setting and plan of care. [x] Patient/family agree to work toward stated goals and plan of care. [] Patient understands intent and goals of therapy, but is neutral about his/her participation. [] Patient is unable to participate in goal setting and plan of care. Thank you for this referral. 
Esther Terry, OTR/L Time Calculation: 38 mins G-Codes (GP) Self Care  Current  CM= 80-99%  Goal  CJ= 20-39% The severity rating is based on the professional judgement & direct observation of Level of Assistance required for Functional Mobility and ADLs. Eval Complexity: History: HIGH Complexity : Extensive review of history including physical, cognitive and psychosocial history ; Examination: HIGH Complexity : 5 or more performance deficits relating to physical, cognitive , or psychosocial skils that result in activity limitations and / or participation restrictions; Decision Making:HIGH Complexity : Patient presents with comorbidities that affect occupational performance. Signifigant modification of tasks or assistance (eg, physical or verbal) with assessment (s) is necessary to enable patient to complete evaluation

## 2018-09-27 NOTE — PROGRESS NOTES
Plan: pt has been accepted for Saturday 9/29 admission to [facility] at [address], [place]. [phone] for report. Please include all hard scripts for controlled substances, med rec and dc summary in packet. Please medicate for pain prior to dc if possible and needed to help offset delay when patient first arrives to facility. Please schedule transport to facility for pt to arrive by 2pm day of discharge. Reason for Admission:   RTKR 
                
RRAT Score:          12 Plan for utilizing home health:    n/a Likelihood of Readmission:   green Transition of Care Plan:        Chart reviewed, met with pt in room. Noted PT recommendation for SNF, pt states he plans SNF discharge, noted dc order for home. FOC offered, pt chose [facility]; referral placed with CMS. Pt will need medical transport day of discharge for safety, is aware he may incur cost for transport. CM following to finalize plan. Care Management Interventions PCP Verified by CM: Yes Transition of Care Consult (CM Consult): SNF Partner SNF: Yes Discharge Durable Medical Equipment: No 
Physical Therapy Consult: Yes Occupational Therapy Consult: Yes Current Support Network: Lives with Spouse Confirm Follow Up Transport: Family Plan discussed with Pt/Family/Caregiver: Yes Freedom of Choice Offered: Yes Discharge Location Discharge Placement: Skilled nursing facility

## 2018-09-27 NOTE — PROGRESS NOTES
1607 - This nurse answered call light when CNA asked for somebody to come assist her. Upon entering room, patient noted lying horizontally across bed. CNA stated patient was confused. Patient alert and verbal. This nurse assessed patient's mental status. Patient able to state he is in [facility] and that his surgeon is Dr. Honorio Goyal. Difficulty determining why he was here. Able to state that president is Loulou and year is 2018. Assisted patient into sitting position on edge of bed. O2 sats noted 95% on room air. Pulse in 80's. BP noted 88/49 in right arm & 79/36 in left arm. Along with CNA, we attempted to get patient into a lying position in bed but attempt was unsuccessful. Patient noted pale in complexion and reported being \"a little dizzy\". This nurse called primary nurse, Charity DE ANDA to room for further assistance.

## 2018-09-28 LAB
ANION GAP SERPL CALC-SCNC: 9 MMOL/L (ref 3–18)
BUN SERPL-MCNC: 34 MG/DL (ref 7–18)
BUN/CREAT SERPL: 18 (ref 12–20)
CALCIUM SERPL-MCNC: 9.1 MG/DL (ref 8.5–10.1)
CHLORIDE SERPL-SCNC: 104 MMOL/L (ref 100–108)
CO2 SERPL-SCNC: 24 MMOL/L (ref 21–32)
CREAT SERPL-MCNC: 1.93 MG/DL (ref 0.6–1.3)
GLUCOSE BLD STRIP.AUTO-MCNC: 241 MG/DL (ref 70–110)
GLUCOSE BLD STRIP.AUTO-MCNC: 243 MG/DL (ref 70–110)
GLUCOSE BLD STRIP.AUTO-MCNC: 245 MG/DL (ref 70–110)
GLUCOSE BLD STRIP.AUTO-MCNC: 296 MG/DL (ref 70–110)
GLUCOSE SERPL-MCNC: 220 MG/DL (ref 74–99)
HCT VFR BLD AUTO: 31.5 % (ref 36–48)
HGB BLD-MCNC: 10.5 G/DL (ref 13–16)
MAGNESIUM SERPL-MCNC: 2.1 MG/DL (ref 1.6–2.6)
POTASSIUM SERPL-SCNC: 4.5 MMOL/L (ref 3.5–5.5)
SODIUM SERPL-SCNC: 137 MMOL/L (ref 136–145)

## 2018-09-28 PROCEDURE — 36415 COLL VENOUS BLD VENIPUNCTURE: CPT | Performed by: PHYSICIAN ASSISTANT

## 2018-09-28 PROCEDURE — 74011250636 HC RX REV CODE- 250/636: Performed by: ORTHOPAEDIC SURGERY

## 2018-09-28 PROCEDURE — 65270000029 HC RM PRIVATE

## 2018-09-28 PROCEDURE — 82962 GLUCOSE BLOOD TEST: CPT

## 2018-09-28 PROCEDURE — 97110 THERAPEUTIC EXERCISES: CPT

## 2018-09-28 PROCEDURE — 85018 HEMOGLOBIN: CPT | Performed by: PHYSICIAN ASSISTANT

## 2018-09-28 PROCEDURE — 80048 BASIC METABOLIC PNL TOTAL CA: CPT | Performed by: PHYSICIAN ASSISTANT

## 2018-09-28 PROCEDURE — 74011636637 HC RX REV CODE- 636/637: Performed by: PHYSICIAN ASSISTANT

## 2018-09-28 PROCEDURE — 83735 ASSAY OF MAGNESIUM: CPT | Performed by: PHYSICIAN ASSISTANT

## 2018-09-28 PROCEDURE — 74011636637 HC RX REV CODE- 636/637: Performed by: ORTHOPAEDIC SURGERY

## 2018-09-28 PROCEDURE — 74011250637 HC RX REV CODE- 250/637: Performed by: PHYSICIAN ASSISTANT

## 2018-09-28 PROCEDURE — 74011636637 HC RX REV CODE- 636/637: Performed by: HOSPITALIST

## 2018-09-28 PROCEDURE — 97116 GAIT TRAINING THERAPY: CPT

## 2018-09-28 RX ORDER — INSULIN LISPRO 100 [IU]/ML
10 INJECTION, SOLUTION INTRAVENOUS; SUBCUTANEOUS
Status: DISCONTINUED | OUTPATIENT
Start: 2018-09-28 | End: 2018-09-29 | Stop reason: HOSPADM

## 2018-09-28 RX ORDER — INSULIN GLARGINE 100 [IU]/ML
35 INJECTION, SOLUTION SUBCUTANEOUS 2 TIMES DAILY
Status: DISCONTINUED | OUTPATIENT
Start: 2018-09-28 | End: 2018-09-29 | Stop reason: HOSPADM

## 2018-09-28 RX ADMIN — ALLOPURINOL 100 MG: 100 TABLET ORAL at 17:24

## 2018-09-28 RX ADMIN — HYDROMORPHONE HYDROCHLORIDE 4 MG: 2 TABLET ORAL at 16:54

## 2018-09-28 RX ADMIN — ALLOPURINOL 100 MG: 100 TABLET ORAL at 08:40

## 2018-09-28 RX ADMIN — INSULIN GLARGINE 30 UNITS: 100 INJECTION, SOLUTION SUBCUTANEOUS at 08:40

## 2018-09-28 RX ADMIN — Medication 1 TABLET: at 08:40

## 2018-09-28 RX ADMIN — INSULIN LISPRO 6 UNITS: 100 INJECTION, SOLUTION INTRAVENOUS; SUBCUTANEOUS at 08:11

## 2018-09-28 RX ADMIN — INSULIN GLARGINE 35 UNITS: 100 INJECTION, SOLUTION SUBCUTANEOUS at 20:57

## 2018-09-28 RX ADMIN — Medication 10 ML: at 22:39

## 2018-09-28 RX ADMIN — OMEPRAZOLE 20 MG: 20 CAPSULE, DELAYED RELEASE ORAL at 08:40

## 2018-09-28 RX ADMIN — FUROSEMIDE 40 MG: 40 TABLET ORAL at 17:24

## 2018-09-28 RX ADMIN — ROSUVASTATIN CALCIUM 40 MG: 10 TABLET, FILM COATED ORAL at 22:40

## 2018-09-28 RX ADMIN — LOSARTAN POTASSIUM 50 MG: 50 TABLET ORAL at 08:40

## 2018-09-28 RX ADMIN — INSULIN LISPRO 7 UNITS: 100 INJECTION, SOLUTION INTRAVENOUS; SUBCUTANEOUS at 08:10

## 2018-09-28 RX ADMIN — ASPIRIN 325 MG ORAL TABLET 325 MG: 325 PILL ORAL at 17:24

## 2018-09-28 RX ADMIN — INSULIN LISPRO 10 UNITS: 100 INJECTION, SOLUTION INTRAVENOUS; SUBCUTANEOUS at 16:53

## 2018-09-28 RX ADMIN — INSULIN LISPRO 6 UNITS: 100 INJECTION, SOLUTION INTRAVENOUS; SUBCUTANEOUS at 16:52

## 2018-09-28 RX ADMIN — INSULIN LISPRO 9 UNITS: 100 INJECTION, SOLUTION INTRAVENOUS; SUBCUTANEOUS at 11:27

## 2018-09-28 RX ADMIN — INSULIN LISPRO 10 UNITS: 100 INJECTION, SOLUTION INTRAVENOUS; SUBCUTANEOUS at 11:27

## 2018-09-28 RX ADMIN — NIFEDIPINE 120 MG: 60 TABLET, FILM COATED, EXTENDED RELEASE ORAL at 08:41

## 2018-09-28 RX ADMIN — INSULIN LISPRO 6 UNITS: 100 INJECTION, SOLUTION INTRAVENOUS; SUBCUTANEOUS at 22:40

## 2018-09-28 RX ADMIN — Medication 10 ML: at 16:57

## 2018-09-28 RX ADMIN — TAMSULOSIN HYDROCHLORIDE 0.4 MG: 0.4 CAPSULE ORAL at 08:40

## 2018-09-28 RX ADMIN — SODIUM CHLORIDE, SODIUM LACTATE, POTASSIUM CHLORIDE, AND CALCIUM CHLORIDE 125 ML/HR: 600; 310; 30; 20 INJECTION, SOLUTION INTRAVENOUS at 02:57

## 2018-09-28 RX ADMIN — LISINOPRIL 40 MG: 20 TABLET ORAL at 08:41

## 2018-09-28 RX ADMIN — GABAPENTIN 600 MG: 300 CAPSULE ORAL at 08:40

## 2018-09-28 RX ADMIN — HYDROMORPHONE HYDROCHLORIDE 2 MG: 2 TABLET ORAL at 08:41

## 2018-09-28 RX ADMIN — ASPIRIN 325 MG ORAL TABLET 325 MG: 325 PILL ORAL at 08:40

## 2018-09-28 RX ADMIN — ACETAMINOPHEN 650 MG: 325 TABLET ORAL at 20:58

## 2018-09-28 RX ADMIN — FUROSEMIDE 40 MG: 40 TABLET ORAL at 08:40

## 2018-09-28 NOTE — PROGRESS NOTES
1930 Assumed pt care at this time. Received report from Leonard Barboza RN. Pt rating pain 0/10. Pt in bed in lowest position. Pt instructed not to get out of bed without assistance. Pt verbalized understanding. Call light within reach. Will continue to monitor. 2045 Assessment complete. Pt is alert and oriented x 4. Denies SOB and chest pain. Pt lungs clear bilaterally. Capillary refill less than 3 seconds. Pt denies numbness and tingling in all extremities. Stated pain 0/10. Pt has ACE wrap with mepilex dressing. PLEXI's and TEDs applied to BLE. Pt encouraged to continue use of IS. Pt verbalized understanding. Ice pack applied. Call light and possessions within reach. Bed in lowest position. Pt verbalized understanding about having to call before getting OOB. Will continue to monitor. 2046 Removed pt's 18 G IV to the L wrist at this time. IV was infiltrated. 2100 C. Cathleen Springer RN inserted 20 G IV to the R wrist at this time. 2102 500 mL bolus started at this time. 2205 Bolus complete at this time. Pt instructed not to get out of bed without assistance. Shift summary: Pt is alert and oriented x 4. Did not experience any episodes of confusion. Did not try to get OOB by himself. Pt had an uneventful shift. Pt voiding sufficient amounts. Pain controlled by PRN medication.

## 2018-09-28 NOTE — DIABETES MGMT
Glycemic Control Pt's BG levels running high. Noted MD's increase in insulin regimen: now 35 units Lantus, 10 units mealtime insulin lispro, and corrective insulin lispro. Will continue to monitor BG levels. Recent Glucose Results:  
Lab Results Component Value Date/Time  (H) 09/28/2018 02:30 AM  
 GLUCPOC 241 (H) 09/28/2018 04:24 PM  
 GLUCPOC 296 (H) 09/28/2018 10:52 AM  
 GLUCPOC 243 (H) 09/28/2018 06:46 AM  
 
 
 
 
Darius Gonzalez, [address] [place] [phone]

## 2018-09-28 NOTE — PROGRESS NOTES
6841 - Patient in bed at this time. A/O x 3. IV to right wrist  intact and patent. Plexis to feet. Elastic bandage with mepilex dressing to right knee CDI. Denies numbness/tingling. Pedal pulses palpable. Lungs clear. Bowel sounds present to all quadrants. Patient able to get to 1750 on the incentive spirometer. Pain 8/10. PRN Dilaudid 2mg pain medication administered at this time. Patient has been educated on side effects. 1654-Pain 8/10. PRN Dilaudid 4 mg pain medication administered at this time. Patient has been educated on side effects. Summary-Pt has difficulty ambulating but was able to ambulate to the bedside chair to have dinner. Voiding and needs encouragement to requests PRN pain medication to keep pain managed and increase ambulation.

## 2018-09-28 NOTE — ROUTINE PROCESS
Bedside report received from [facility]. Patient in bed at this time. Care assumed at this time.

## 2018-09-28 NOTE — PROGRESS NOTES
1130: attempted to treat pt, CNA getting vitals and lunch arrived. Will follow up after lunch for PT.

## 2018-09-28 NOTE — PROGRESS NOTES
Problem: Mobility Impaired (Adult and Pediatric) Goal: *Acute Goals and Plan of Care (Insert Text) In 1-7 days pt will be able to perform: ST.  Bed mobility:  Rolling L to R to L modified independent for positioning. 2.  Supine to sit to supine S with HR for meals. 3.  Sit to stand to sit S with RW in prep for ambulation. LT.  Gait:  Ambulate >150ft S with RW, WBAT, for home/community mobility. 2.  Activity tolerance: Tolerate up in chair 1-2 hours for ADLs. 3.  Patient/Family Education:  Patient/family to be independent with HEP for follow-up care and safe discharge. Outcome: Progressing Towards Goal 
physical Therapy TREATMENT Patient: Edinson Alonso (78 y.o. male) Date: 2018 Diagnosis: RIGHT KNEE OSTEOARTHRITIS Right knee DJD Primary osteoarthritis of right knee Procedure(s) (LRB): 
RIGHT TOTAL KNEE ARTHROPLASTY (Right) 2 Days Post-Op Precautions: Fall, WBAT (R TKA) Chart, physical therapy assessment, plan of care and goals were reviewed. ASSESSMENT: 
Increased time needed to carry out all activities. Filippo with RLE to sit up EOB. Bed elevated to assist with sit to stand. Performed lateral and forward/backward steps for a total of 9 ft. Pt putting very little wt through RLE, increased wt bearing through UEs on the RW. Performed seated and supine ROM strengthening exercises, 10 reps each. Pt left in bed with towel roll under R ankle and at R hip for neutral alignment and to facilitate knee extension. Education: HEP every hour per Dr protocol Progression toward goals: 
[]      Improving appropriately and progressing toward goals [x]      Improving slowly and progressing toward goals 
[]      Not making progress toward goals and plan of care will be adjusted PLAN: 
Patient continues to benefit from skilled intervention to address the above impairments. Continue treatment per established plan of care. Discharge Recommendations:  Elmer Lino Further Equipment Recommendations for Discharge:  rolling walker SUBJECTIVE:  
Patient stated I feel fine if I am just laying here.  OBJECTIVE DATA SUMMARY:  
Critical Behavior: 
Neurologic State: Alert Orientation Level: Oriented X4 Cognition: Appropriate decision making Safety/Judgement: Decreased insight into deficits, Awareness of environment Functional Mobility Training: 
Bed Mobility: 
Supine to Sit: Additional time;Minimum assistance Sit to Supine: Moderate assistance Transfers: 
Sit to Stand: Moderate assistance Stand to Sit: Contact guard assistance Balance: 
Sitting: Intact; With support Standing: Impaired; With support Standing - Static: Fair Standing - Dynamic : Fair Ambulation/Gait Training: 
Distance (ft): 9 Feet (ft) Assistive Device: Gait belt;Walker, rolling Ambulation - Level of Assistance: Contact guard assistance Gait Abnormalities: Antalgic;Decreased step clearance; Step to gait Right Side Weight Bearing: As tolerated Base of Support: Widened Speed/Beatrice: Slow;Delayed [address] [facility] [id number] Current  CK= 40-59%   Goal  CI= 1-19%. The severity rating is based on the Level of Assistance required for Functional Mobility and ADLs. Therapeutic Exercises: LAQ, QS, HS 10x each Pain: 
Pre:0 Post:2 Pain Scale 1: Numeric (0 - 10) Pain Location 1: Leg;Foot Pain Orientation 1: Right Pain Description 1: Throbbing Activity Tolerance:  
Fair(-) Please refer to the flowsheet for vital signs taken during this treatment. After treatment:  
[] Patient left in no apparent distress sitting up in chair 
[x] Patient left in no apparent distress in bed 
[x] Call bell left within reach 
[] Nursing notified 
[] Caregiver present 
[] Bed alarm activated Elisabeth Bloom PTA Time Calculation: 30 mins

## 2018-09-28 NOTE — PROGRESS NOTES
Progress Note Patient: Amanda Garcia               Sex: male          DOA: 9/26/2018 YOB: 1945      Age:  68 y.o.        LOS:  LOS: 2 days Status Post: Procedure(s): RIGHT TOTAL KNEE ARTHROPLASTY Surgery Date: 9/26/2018 Subjective:  
 
Amanda Garcia is a 68 y.o. male who c/o some pain, but it is reasonably well controlled. Denies dizziness. Overnight events noted. Objective:  
  
Visit Vitals  /51  Pulse 87  Temp 99 °F (37.2 °C)  Resp 17  Ht 6' (1.829 m)  Wt 147.3 kg (324 lb 12.8 oz)  SpO2 96%  BMI 44.05 kg/m2 Physical Exam:  
Dressing:  clean, dry, intact - same as POD # 1 Wiggles Toes/Ankle Foot sensation intact to light touch No foot edema/ +1 Posterior Tibial Pulse Calf soft, supple and non tender. Negative Homans sign. Intake and Output: 
Current Shift:  09/28 0701 - 09/28 1900 In: 1852.5 [P.O.:300; I.V.:1552.5] Out: 300 [Urine:300] Last three shifts:  09/26 1901 - 09/28 0700 In: 1970 [P.O.:1070; I.V.:900] Out: 4225 [REGRC:1101] Voiding Status:  Voiding without the need for catheter. Lab/Data Reviewed: 
Recent Labs  
   09/28/18 
 0230 HGB  10.5* HCT  31.5* NA  137  
K  4.5  
CL  104 CO2  24 BUN  34* CREA  1.93* GLU  220* Medications Reviewed Assessment/Plan Principal Problem: 
  Primary osteoarthritis of right knee (9/24/2018) Active Problems: Hypertension (2/18/2014) Gastroesophageal reflux disease (2/18/2014) Diabetes mellitus ([facility] [address] (2/18/2014) Sleep apnea (2/18/2014) Hypercholesteremia (2/18/2014) Right knee DJD (9/26/2018) Orthostatic dizziness (9/27/2018) · Discharge Planning for rehab. · Continue DVT Prophylaxis. · Continue PT WBAT, ROM as tolerated. Continue with aggressive ROM exercises hourly using the physical therapy exercises sheet. · D/C rehab tomorrow and when medically cleared. The plan was discussed with Dr. Maria Luz Lugo today as well and he is in agreement with above.

## 2018-09-28 NOTE — PROGRESS NOTES
Problem: Falls - Risk of 
Goal: *Absence of Falls Document April Venessa Fall Risk and appropriate interventions in the flowsheet. Outcome: Progressing Towards Goal 
Fall Risk Interventions: 
Mobility Interventions: Patient to call before getting OOB Medication Interventions: Patient to call before getting OOB Elimination Interventions: Call light in reach

## 2018-09-28 NOTE — ROUTINE PROCESS
Bedside and verbal shift change report given to Eileen Ibanez RN (oncoming nurse) by Kayleigh Wells RN (offgoing nurse). Report included the following information: SBAR, Kardex, MAR, and recent results.

## 2018-09-28 NOTE — PROGRESS NOTES
Hospitalist Progress Note-critical care note Patient: Richard Madera MRN: [id number]  CSN: [id number] YOB: 1945  Age: 68 y.o. Sex: male DOA: 9/26/2018 LOS:  LOS: 2 days Chief complaint: dizziness, hypertension, dm  
 
Assessment/Plan Hospital Problems  Date Reviewed: 9/26/2018 Codes Class Noted POA Orthostatic dizziness ICD-10-CM: U65 ICD-9-CM: 780.4  9/27/2018 Unknown Right knee DJD ICD-10-CM: M17.11 ICD-9-CM: 715.96  9/26/2018 Unknown * (Principal)Primary osteoarthritis of right knee (Chronic) ICD-10-CM: M17.11 ICD-9-CM: 715.16  9/24/2018 Yes Hypertension (Chronic) ICD-10-CM: I10 
ICD-9-CM: 401.9  2/18/2014 Yes Gastroesophageal reflux disease (Chronic) ICD-10-CM: K21.9 ICD-9-CM: 530.81  2/18/2014 Yes Diabetes mellitus (HCC) (Chronic) ICD-10-CM: E11.9 ICD-9-CM: 250.00  2/18/2014 Yes Sleep apnea (Chronic) ICD-10-CM: G47.30 ICD-9-CM: 780.57  2/18/2014 Yes Hypercholesteremia (Chronic) ICD-10-CM: E78.00 ICD-9-CM: 272.0  2/18/2014 Yes Orthostatic dizziness Resolved per iv  bolus, recommend to move slowly. So far no neuro deficit and alert and oriented, will continue monitor, will hold ct head for  Now  
  
Confusion Resolved. 
  
DM type II  
-hyperglycemia . take 82 units lantus twice at home, change to 35 units twice a day  ssi, increase premeal to 10 units  
 hypoglycemia protocol , diabetic diet  
  
 HTN Well controlled at home, will continue home medication, check renal function and electrolytes 
  
  
ckd3 Mild elevated, will continue iv hydration  
  
milton on cpap  
abg was good, continue cpap  
  
Rt knee replacement  
-Routine postoperative management, pain control Subjective: I feel fine, no dizziness Nurse: more alert now, no confusion so far Review of systems: 
 
General: No fevers or chills. Cardiovascular: No chest pain or pressure. No palpitations. Pulmonary: No shortness of breath. Gastrointestinal: No nausea, vomiting. Vital signs/Intake and Output: 
Visit Vitals  /51  Pulse 87  Temp 99 °F (37.2 °C)  Resp 17  Ht 6' (1.829 m)  Wt 147.3 kg (324 lb 12.8 oz)  SpO2 96%  BMI 44.05 kg/m2 Current Shift:  09/28 0701 - 09/28 1900 In: 1368.5 [I.V.:1368.5] Out: - Last three shifts:  09/26 1901 - 09/28 0700 In: 1970 [P.O.:1070; I.V.:900] Out: 4225 [IXUYF:9117] Physical Exam: 
General: WD, WN. Alert, cooperative, no acute distress   
HEENT: NC, Atraumatic. PERRLA, anicteric sclerae. Lungs: CTA Bilaterally. No Wheezing/Rhonchi/Rales. Heart:  Regular  rhythm,  No murmur, No Rubs, No Gallops Abdomen: Soft, Non distended, Non tender.  +Bowel sounds, Extremities: No c/c, rt leg wrapped with gauze Psych:   Not anxious or agitated. Neurologic:  No acute neurological deficit. Labs: Results:  
   
Chemistry Recent Labs  
   09/28/18 
 0230  09/27/18 
 6200 GLU  220*  240* NA  137  135* K  4.5  5.1 CL  104  103 CO2  24  21 BUN  34*  36* CREA  1.93*  1.79* CA  9.1  9.2 AGAP  9  11 BUCR  18  20 CBC w/Diff Recent Labs  
   09/28/18 
 0230  09/27/18 
 0525 HGB  10.5*  11.3* HCT  31.5*  34.9* Cardiac Enzymes No results for input(s): CPK, CKND1, DANNIELLE in the last 72 hours. No lab exists for component: Camillia Marbury Coagulation No results for input(s): PTP, INR, APTT in the last 72 hours. No lab exists for component: INREXT Lipid Panel No results found for: CHOL, CHOLPOCT, CHOLX, CHLST, CHOLV, 377167, HDL, LDL, LDLC, DLDLP, 576342, VLDLC, VLDL, TGLX, TRIGL, TRIGP, TGLPOCT, CHHD, CHHDX  
BNP No results for input(s): BNPP in the last 72 hours. Liver Enzymes No results for input(s): TP, ALB, TBIL, AP, SGOT, GPT in the last 72 hours. No lab exists for component: DBIL Thyroid Studies No results found for: T4, T3U, TSH, TSHEXT    
 Procedures/imaging: see electronic medical records for all procedures/Xrays and details which were not copied into this note but were reviewed prior to creation of Plan Kev Lai MD

## 2018-09-28 NOTE — DISCHARGE SUMMARY
DISCHARGE SUMMARY    Patient: Marilu Torres MRN: [id number]  CSN: [id number]    YOB: 1945  Age: 68 y.o. Sex: male              Admit Date: 9/26/2018    Discharge Date:     Admission Diagnoses: RIGHT KNEE OSTEOARTHRITIS  Right knee DJD    Discharge Diagnoses:    Problem List as of 9/28/2018  Date Reviewed: 9/26/2018          Codes Class Noted - Resolved    Orthostatic dizziness ICD-10-CM: R42  ICD-9-CM: 780.4  9/27/2018 - Present        Right knee DJD ICD-10-CM: M17.11  ICD-9-CM: 715.96  9/26/2018 - Present        * (Principal)Primary osteoarthritis of right knee (Chronic) ICD-10-CM: M17.11  ICD-9-CM: 715.16  9/24/2018 - Present        Left knee DJD (Chronic) ICD-10-CM: M17.12  ICD-9-CM: 715.96  3/12/2014 - Present        Hypertension (Chronic) ICD-10-CM: I10  ICD-9-CM: 401.9  2/18/2014 - Present        Gastroesophageal reflux disease (Chronic) ICD-10-CM: K21.9  ICD-9-CM: 530.81  2/18/2014 - Present        Diabetes mellitus ([facility] (Chronic) ICD-10-CM: E11.9  ICD-9-CM: 250.00  2/18/2014 - Present        Sleep apnea (Chronic) ICD-10-CM: G47.30  ICD-9-CM: 780.57  2/18/2014 - Present        Hypercholesteremia (Chronic) ICD-10-CM: E78.00  ICD-9-CM: 272.0  2/18/2014 - Present        RESOLVED: Urgency of urination ICD-10-CM: R39.15  ICD-9-CM: 788.63  3/12/2013 - 3/15/2014        RESOLVED: Nocturia ICD-10-CM: R35.1  ICD-9-CM: 788.43  3/12/2013 - 3/15/2014        RESOLVED: Acute prostatitis ICD-10-CM: N41.0  ICD-9-CM: 601.0  1/30/2013 - 3/15/2014        RESOLVED: Pituitary Adenoma ICD-10-CM: D35.2, D35.3  ICD-9-CM: 227.3  1/30/2013 - 3/15/2014        RESOLVED: Hypogonadism, Pituitary Adenoma ICD-10-CM: E29.1  ICD-9-CM: 257.2  1/30/2013 - 3/15/2014        RESOLVED: Obesity ICD-10-CM: E66.9  ICD-9-CM: 278.00  1/30/2013 - 3/15/2014              Discharge Condition: Stable    Hospital Course:   On the day of admission the patient underwent a Right TKA that was done under general anesthesia without complications. The patient was started on  mg BID for anti embolism prophylaxis. The patient was voiding spontaneously without need for a Rojas catheter. The patient was also started preoperatively on Ancef  that was continued for 2 more doses IV q8h for infection prophylaxis. Physical Therapy was begun postoperatively with patient weight bearing as tolerated. On POD # 1 patient's dressing was clean, dry and intact. Patient was able to wiggle their toes and ankle with sensation intact to light touch. The patient had no edema with a present 2+ posterior tibialis pulse. He had some AMS and ortho stasis. Medicine was consulted and the patient improved with a bolus. On POD #2 patient's physical exam was unchanged. Patient's incision was examined and looked good without signs or symptoms of infection. Patient's dressing was reapplied. His blood sugars were not well controlled and these were adjusted by the medical team.  On POD # 3 the patient will be rounded on by one of Dr. Penelope Melendez' partners and as long as they are orthopaedically and medically stable they will discharged to Rehab. Patient's hemoglobins were    Recent Labs      09/28/18   0230  09/27/18   0525   HGB  10.5*  11.3*   . Patient temp max was. Temp (72hrs), Av.4 °F (36.9 °C), Min:97.6 °F (36.4 °C), Max:99.5 °F (37.5 °C)  . Discharge Medications:     Current Discharge Medication List      START taking these medications    Details   HYDROmorphone (DILAUDID) 2 mg tablet Take 1-2 Tabs by mouth every four (4) hours as needed for Pain. Max Daily Amount: 24 mg. Qty: 60 Tab, Refills: 0    Associated Diagnoses: Primary osteoarthritis of right knee      naloxone (NARCAN) 4 mg/actuation nasal spray Use 1 spray intranasally, then discard. Repeat with new spray every 2 min as needed for opioid overdose symptoms, alternating nostrils.   Qty: 1 Each, Refills: 0         CONTINUE these medications which have CHANGED    Details   aspirin (ASPIRIN) 325 mg tablet Take 1 Tab by mouth two (2) times a day. Take for 3 weeks for DVT prophylaxis. Qty: 42 Tab, Refills: 0         CONTINUE these medications which have NOT CHANGED    Details   ALBUTEROL SULFATE IN Take  by inhalation as needed. allopurinol (ZYLOPRIM) 100 mg tablet Take 100 mg by mouth two (2) times a day.      gabapentin (NEURONTIN) 600 mg tablet Take 1,200 mg by mouth nightly. ferrous fumarate/vit Bcomp,C (SUPER B COMPLEX PO) Take  by mouth daily. tamsulosin (FLOMAX) 0.4 mg capsule Take 0.4 mg by mouth daily. insulin glargine (LANTUS SOLOSTAR) 100 unit/mL (3 mL) pen 82 Units by SubCUTAneous route two (2) times a day. Indications: type 2 diabetes mellitus      psyllium (METAMUCIL) powd Take  by mouth as needed. lisinopril (PRINIVIL, ZESTRIL) 40 mg tablet Take 40 mg by mouth daily. Indications: hypertension      omeprazole (PRILOSEC) 20 mg capsule Take 20 mg by mouth daily. Pt instructed to take am of surgery. Indications: GASTROESOPHAGEAL REFLUX      potassium chloride SR (KLOR-CON) 8 mEq tablet Take 8 mEq by mouth daily. rosuvastatin (CRESTOR) 40 mg tablet Take 40 mg by mouth nightly. NON FORMULARY  Indications: HYPERCHOLESTEROLEMIA      furosemide (LASIX) 40 mg tablet Take 40 mg by mouth two (2) times a day. Indications: EDEMA      losartan (COZAAR) 50 mg tablet Take 50 mg by mouth daily. Indications: hypertension      gabapentin (NEURONTIN) 300 mg capsule Take 600 mg by mouth daily. Indications: NEUROPATHIC PAIN      glimepiride (AMARYL) 2 mg tablet Take 4 mg by mouth two (2) times a day. Indications: type 2 diabetes mellitus      NIFEdipine CR (ADALAT CC) 60 mg CR tablet Take 120 mg by mouth daily. Pt instructed to take am of surgery.   Indications: hypertension         STOP taking these medications       diclofenac (VOLTAREN) 1 % gel Comments:   Reason for Stopping:               Activity: No heavy lifting, pushing, pulling with the implant side for 2 months and PT/OT Eval and Treat    Diet: Diabetic Diet    Wound Care: Keep wound clean and dry    Follow-up: 2 weeks post-op in the office with Dr. Gabriele Henry

## 2018-09-29 VITALS
SYSTOLIC BLOOD PRESSURE: 108 MMHG | OXYGEN SATURATION: 96 % | BODY MASS INDEX: 42.66 KG/M2 | HEART RATE: 84 BPM | DIASTOLIC BLOOD PRESSURE: 48 MMHG | TEMPERATURE: 98.7 F | RESPIRATION RATE: 18 BRPM | WEIGHT: 315 LBS | HEIGHT: 72 IN

## 2018-09-29 LAB
ANION GAP SERPL CALC-SCNC: 9 MMOL/L (ref 3–18)
BUN SERPL-MCNC: 34 MG/DL (ref 7–18)
BUN/CREAT SERPL: 15 (ref 12–20)
CALCIUM SERPL-MCNC: 8.8 MG/DL (ref 8.5–10.1)
CHLORIDE SERPL-SCNC: 105 MMOL/L (ref 100–108)
CO2 SERPL-SCNC: 24 MMOL/L (ref 21–32)
CREAT SERPL-MCNC: 2.21 MG/DL (ref 0.6–1.3)
GLUCOSE BLD STRIP.AUTO-MCNC: 240 MG/DL (ref 70–110)
GLUCOSE BLD STRIP.AUTO-MCNC: 264 MG/DL (ref 70–110)
GLUCOSE SERPL-MCNC: 209 MG/DL (ref 74–99)
MAGNESIUM SERPL-MCNC: 2.1 MG/DL (ref 1.6–2.6)
POTASSIUM SERPL-SCNC: 4.5 MMOL/L (ref 3.5–5.5)
SODIUM SERPL-SCNC: 138 MMOL/L (ref 136–145)

## 2018-09-29 PROCEDURE — 36415 COLL VENOUS BLD VENIPUNCTURE: CPT | Performed by: HOSPITALIST

## 2018-09-29 PROCEDURE — 74011636637 HC RX REV CODE- 636/637: Performed by: ORTHOPAEDIC SURGERY

## 2018-09-29 PROCEDURE — 80048 BASIC METABOLIC PNL TOTAL CA: CPT | Performed by: HOSPITALIST

## 2018-09-29 PROCEDURE — 82962 GLUCOSE BLOOD TEST: CPT

## 2018-09-29 PROCEDURE — 97110 THERAPEUTIC EXERCISES: CPT

## 2018-09-29 PROCEDURE — 83735 ASSAY OF MAGNESIUM: CPT | Performed by: HOSPITALIST

## 2018-09-29 PROCEDURE — 97116 GAIT TRAINING THERAPY: CPT

## 2018-09-29 PROCEDURE — 74011636637 HC RX REV CODE- 636/637: Performed by: HOSPITALIST

## 2018-09-29 PROCEDURE — 74011250637 HC RX REV CODE- 250/637: Performed by: PHYSICIAN ASSISTANT

## 2018-09-29 PROCEDURE — 97530 THERAPEUTIC ACTIVITIES: CPT

## 2018-09-29 RX ADMIN — INSULIN GLARGINE 35 UNITS: 100 INJECTION, SOLUTION SUBCUTANEOUS at 08:10

## 2018-09-29 RX ADMIN — INSULIN LISPRO 6 UNITS: 100 INJECTION, SOLUTION INTRAVENOUS; SUBCUTANEOUS at 11:58

## 2018-09-29 RX ADMIN — ONDANSETRON 4 MG: 4 TABLET, ORALLY DISINTEGRATING ORAL at 07:45

## 2018-09-29 RX ADMIN — TAMSULOSIN HYDROCHLORIDE 0.4 MG: 0.4 CAPSULE ORAL at 08:21

## 2018-09-29 RX ADMIN — INSULIN LISPRO 10 UNITS: 100 INJECTION, SOLUTION INTRAVENOUS; SUBCUTANEOUS at 11:57

## 2018-09-29 RX ADMIN — LOSARTAN POTASSIUM 50 MG: 50 TABLET ORAL at 08:21

## 2018-09-29 RX ADMIN — NIFEDIPINE 120 MG: 60 TABLET, FILM COATED, EXTENDED RELEASE ORAL at 08:21

## 2018-09-29 RX ADMIN — HYDROMORPHONE HYDROCHLORIDE 2 MG: 2 TABLET ORAL at 08:26

## 2018-09-29 RX ADMIN — ALLOPURINOL 100 MG: 100 TABLET ORAL at 08:21

## 2018-09-29 RX ADMIN — ASPIRIN 325 MG ORAL TABLET 325 MG: 325 PILL ORAL at 08:20

## 2018-09-29 RX ADMIN — INSULIN LISPRO 10 UNITS: 100 INJECTION, SOLUTION INTRAVENOUS; SUBCUTANEOUS at 08:12

## 2018-09-29 RX ADMIN — Medication 10 ML: at 08:17

## 2018-09-29 RX ADMIN — GABAPENTIN 600 MG: 300 CAPSULE ORAL at 08:18

## 2018-09-29 RX ADMIN — OMEPRAZOLE 20 MG: 20 CAPSULE, DELAYED RELEASE ORAL at 08:20

## 2018-09-29 RX ADMIN — INSULIN LISPRO 9 UNITS: 100 INJECTION, SOLUTION INTRAVENOUS; SUBCUTANEOUS at 08:13

## 2018-09-29 RX ADMIN — FUROSEMIDE 40 MG: 40 TABLET ORAL at 08:21

## 2018-09-29 RX ADMIN — LISINOPRIL 40 MG: 20 TABLET ORAL at 08:21

## 2018-09-29 RX ADMIN — Medication 1 TABLET: at 08:20

## 2018-09-29 RX ADMIN — HYDROMORPHONE HYDROCHLORIDE 4 MG: 2 TABLET ORAL at 13:34

## 2018-09-29 NOTE — PROGRESS NOTES
Progress Note POD # Patient: Marilu Torres               Sex: male          DOA: 9/26/2018 YOB: 1945      Surgery: Procedure(s): RIGHT TOTAL KNEE ARTHROPLASTY         
 LOS: 3 days Subjective: No new complaints Objective:  
  
Visit Vitals  /59  Pulse 94  Temp 98.9 °F (37.2 °C)  Resp 17  Ht 6' (1.829 m)  Wt 147.3 kg (324 lb 12.8 oz)  SpO2 97%  BMI 44.05 kg/m2 Physical Exam: 
Neurological: motor strength: 5/5 in lower extremities bilaterally 
                        sensation: intact to light touch Patient mobility Bed Mobility Training Supine to Sit: Additional time, Minimum assistance Sit to Supine: Moderate assistance Scooting: Contact guard assistance, Minimum assistance, Maximum assistance, Additional time Transfer Training Sit to Stand: Moderate assistance Stand to Sit: Contact guard assistance Bed to Chair: Minimum assistance, Adaptive equipment, Additional time (constant cues) Gait Training Assistive Device: Gait belt, Walker, rolling Ambulation - Level of Assistance: Contact guard assistance Distance (ft): 9 Feet (ft) Interventions: Safety awareness training, Tactile cues, Verbal cues Weight Bearing Status Right Side Weight Bearing: As tolerated Intake and Output: 
Current Shift:    
Last three shifts:  09/27 1901 - 09/29 0700 In: 2862.5 [P.O.:1310; I.V.:1552.5] Out: 2400 [Urine:2400] Lab/Data Reviewed: 
 
Lab/Data Reviewed: 
Lab Results Component Value Date/Time WBC 6.5 09/12/2018 02:10 PM  
 HGB 10.5 (L) 09/28/2018 02:30 AM  
 HCT 31.5 (L) 09/28/2018 02:30 AM  
 PLATELET 969 70/35/8351 02:10 PM  
 MCV 96.5 09/12/2018 02:10 PM  
 
No results found for: APTT Lab Results Component Value Date/Time  INR 1.2 03/15/2014 05:35 AM  
 INR 1.1 03/14/2014 04:49 AM  
 INR 1.1 03/13/2014 01:20 PM  
 Prothrombin time 14.2 03/15/2014 05:35 AM  
 Prothrombin time 13.6 03/14/2014 04:49 AM  
 Prothrombin time 13.1 03/13/2014 01:20 PM  
  
 
 
 
Assessment/Plan Principal Problem: 
  Primary osteoarthritis of right knee (9/24/2018) Active Problems: Hypertension (2/18/2014) Gastroesophageal reflux disease (2/18/2014) [facility] (2/18/2014) Sleep apnea (2/18/2014) Hypercholesteremia (2/18/2014) Right knee DJD (9/26/2018) Orthostatic dizziness (9/27/2018) 1. Stable 2. OOB with PT 
3. D/C Planning to rehab today

## 2018-09-29 NOTE — PROGRESS NOTES
2000 - Received report from Kassandra Rucker RN(offgoing nurse). Assumed care of PT. PT assessed. PT oriented to room & given instructions regarding call bell, incentive spirometer, room phone, medications, and pain medications with potential side effects. PT encouraged to use call bell. Phone & call bell left in reach of PT. 20 G(size) IV right wrist (location) present. 2055 - Temp 100.6 F at 1859:9/28/2018. Tylenol 650 mg administered in response. Will continue to monitor patient. 2214 - Patient temp 98.3 F. Will continue to monitor patient. 2338 - Nasal CPAP placed on patient with 2L O2. 
 
0730 - Bedside and Verbal shift change report given to Kassandra Rucker RN (oncoming nurse) by Werner Arechiga RN (offgoing nurse) successfully. Report included the following information SBAR, Kardex, Procedure Summary, Intake/Output, MAR, Accordion, Recent Results and Med Rec Status.

## 2018-09-29 NOTE — ROUTINE PROCESS
Bedside and Verbal shift change report [facility] by Dave Cabral RN. Report included the following information SBAR, Kardex, OR Summary, Intake/Output and MAR.

## 2018-09-29 NOTE — PROGRESS NOTES
Hospitalist Progress Note Patient: Mayte Calderon MRN: [id number]  CSN: [id number] YOB: 1945  Age: 68 y.o. Sex: male DOA: 9/26/2018 LOS:  LOS: 3 days Chief Complaint:  Dizziness and Confusion Assessment/Plan Patient Active Problem List  
Diagnosis Code  Hypertension I10  
 Gastroesophageal reflux disease K21.9  [facility] E11.9  Sleep apnea G47.30  Hypercholesteremia E78.00  Left knee DJD M17.12  
 Primary osteoarthritis of right knee M17.11  
 Right knee DJD M17.11  
 Orthostatic dizziness R42 Orthostasis/dizziness Resolved subsequent to iv bolus, encouraged patient to move slowly. No neuro deficit and alert and oriented, will continue monitor will inpatient.  
   
Confusion:  
Resolved. 
   
DM type II: Hyperglycemia; currently 264. Patient takes 82 units lantus twice daily at home which was changed to 35 units twice a day yesterday.  Increase Lantus to 37 units with10 units mealtime insulin plus ISS Hypoglycemia protocol, diabetic diet  
   
HTN Well controlled at home, will continue home medication, check renal function and electrolytes 
    
CKD3 Mild elevated, will continue iv hydration  
  
milton on cpap Continue cpap  
   
Rt knee replacement  
-Routine postoperative management, pain control Disposition :  Medically cleared for discharge Subjective: No events over night. Patient doing well this AM; dizziness resolved. No new complaints. Review of systems: 
 
Constitutional: denies fevers, chills, myalgias Respiratory: denies SOB, cough Cardiovascular: denies chest pain, palpitations Gastrointestinal: denies nausea, vomiting, diarrhea Vital signs/Intake and Output: 
Visit Vitals  /59  Pulse 94  Temp 98.9 °F (37.2 °C)  Resp 17  Ht 6' (1.829 m)  Wt 147.3 kg (324 lb 12.8 oz)  SpO2 97%  BMI 44.05 kg/m2 Current Shift:    
 Last three shifts:  09/27 1901 - 09/29 0700 In: 2862.5 [P.O.:1310; I.V.:1552.5] Out: 2400 [Urine:2400] Exam: 
 
General: Well developed, alert, NAD, OX3 Head/Neck: NCAT, supple, No masses, No lymphadenopathy CVS:Regular rate and rhythm, no M/R/G, S1/S2 heard, no thrill Lungs:Clear to auscultation bilaterally, no wheezes, rhonchi, or rales Abdomen: Soft, Nontender, No distention, Normal Bowel sounds, No hepatomegaly Extremities: No C/C/E, pulses palpable 2+ Skin:normal texture and turgor, no rashes, no lesions Neuro:grossly normal , follows commands Psych:appropriate Labs: Results:  
   
Chemistry Recent Labs  
   09/29/18 
 6532  09/28/18 
 0230  09/27/18 
 4620 GLU  209*  220*  240* NA  138  137  135* K  4.5  4.5  5.1 CL  105  104  103 CO2  24  24  21 BUN  34*  34*  36* CREA  2.21*  1.93*  1.79* CA  8.8  9.1  9.2 AGAP  9  9  11 BUCR  15  18  20 CBC w/Diff Recent Labs  
   09/28/18 
 0230  09/27/18 
 0525 HGB  10.5*  11.3* HCT  31.5*  34.9* Cardiac Enzymes No results for input(s): CPK, CKND1, DANNIELLE in the last 72 hours. No lab exists for component: Gaston Spittle Coagulation No results for input(s): PTP, INR, APTT in the last 72 hours. No lab exists for component: INREXT Lipid Panel No results found for: CHOL, CHOLPOCT, CHOLX, CHLST, CHOLV, 639914, HDL, LDL, LDLC, DLDLP, 404770, VLDLC, VLDL, TGLX, TRIGL, TRIGP, TGLPOCT, CHHD, CHHDX  
BNP No results for input(s): BNPP in the last 72 hours. Liver Enzymes No results for input(s): TP, ALB, TBIL, AP, SGOT, GPT in the last 72 hours. No lab exists for component: DBIL Thyroid Studies No results found for: T4, T3U, TSH, TSHEXT Procedures/imaging: see electronic medical records for all procedures/Xrays and details which were not copied into this note but were reviewed prior to creation of Plan Giuliana Huynh MD

## 2018-09-29 NOTE — PROGRESS NOTES
Pharmacy Note:  Lisinopril and Losartan Verified with nurse Charity 9/26 about being on both medications. Patient confirmed. /59,  S. Creat  2.21 ( CrCl 44.4 ml/min ), K 4.5  today Niurka Sheldon. Logan Maher  [phone]

## 2018-09-29 NOTE — PROGRESS NOTES
Problem: Falls - Risk of 
Goal: *Absence of Falls Document Simone Carlos Fall Risk and appropriate interventions in the flowsheet. Outcome: Progressing Towards Goal 
Fall Risk Interventions: 
Mobility Interventions: Patient to call before getting OOB Medication Interventions: Patient to call before getting OOB Elimination Interventions: Call light in reach

## 2018-09-29 NOTE — PROGRESS NOTES
Problem: Mobility Impaired (Adult and Pediatric) Goal: *Acute Goals and Plan of Care (Insert Text) In 1-7 days pt will be able to perform: ST.  Bed mobility:  Rolling L to R to L modified independent for positioning. 2.  Supine to sit to supine S with HR for meals. 3.  Sit to stand to sit S with RW in prep for ambulation. LT.  Gait:  Ambulate >150ft S with RW, WBAT, for home/community mobility. 2.  Activity tolerance: Tolerate up in chair 1-2 hours for ADLs. 3.  Patient/Family Education:  Patient/family to be independent with HEP for follow-up care and safe discharge. Outcome: Progressing Towards Goal 
physical Therapy TREATMENT Patient: Jovanny Laughlin (78 y.o. male) Date: 2018 Diagnosis: RIGHT KNEE OSTEOARTHRITIS Right knee DJD Primary osteoarthritis of right knee Procedure(s) (LRB): 
RIGHT TOTAL KNEE ARTHROPLASTY (Right) 3 Days Post-Op Precautions: Fall, WBAT (R TKA) Chart, physical therapy assessment, plan of care and goals were reviewed. ASSESSMENT: 
Pt is able to increase all anjelica of activity. Exercises fairly tolerated and ROM increasing (4- 85 degrees). Continued high level of RW assist needed. Pt is not ready for home at this time. Will F/u until SNF D/c. Progression toward goals: 
[]      Improving appropriately and progressing toward goals [x]      Improving slowly and progressing toward goals 
[]      Not making progress toward goals and plan of care will be adjusted PLAN: 
Patient continues to benefit from skilled intervention to address the above impairments. Continue treatment per established plan of care. Discharge Recommendations:  SNF Further Equipment Recommendations for Discharge:  bedside commode and rolling walker SUBJECTIVE:  
Patient stated Are you sure you're here for me?  OBJECTIVE DATA SUMMARY:  
Critical Behavior: 
Neurologic State: Alert Orientation Level: Oriented X4 Cognition: Appropriate decision making, Appropriate for age attention/concentration, Appropriate safety awareness, Follows commands Safety/Judgement: Decreased insight into deficits, Awareness of environment Functional Mobility Training: 
Bed Mobility: 
Rolling: Supervision Supine to Sit: Supervision Sit to Supine: Supervision Transfers: 
Sit to Stand: Contact guard assistance;Minimum assistance Stand to Sit: Minimum assistance Balance: 
Sitting: Intact; With support Standing: Impaired; With support Standing - Static: Fair Standing - Dynamic : Fair Ambulation/Gait Training: 
Distance (ft): 50 Feet (ft) Assistive Device: Gait belt;Walker, rolling Ambulation - Level of Assistance: Contact guard assistance Gait Abnormalities: Antalgic;Decreased step clearance; Step to gait Right Side Weight Bearing: As tolerated Base of Support: Widened Speed/Beatrice: Slow;Delayed Therapeutic Exercises:  
 
 
EXERCISE Sets Reps Active Active Assist  
Passive Self ROM Comments Ankle Pumps 1 30 [x] [] [] [] Quad Sets/Glut Sets 1 15 [x] [] [] [] Hamstring Sets 1 10 [x] [] [] [] Short Arc Quads 1 10 [x] [] [] [] Heel Slides 1 10 [x] [] [] [] Straight Leg Raises 1 10 [x] [] [] [] Started with AAROM Hip Abd/Add   [] [] [] [] Long Arc Quads 1 10 [x] [] [] [] Seated Marching   [] [] [] []   
Standing Marching   [] [] [] []   
   [] [] [] []   
 
 
Pain: 
Pain Scale 1: Numeric (0 - 10) Pain Intensity 1: 4 Pain out: 6 Activity Tolerance:  
Fair+ Please refer to the flowsheet for vital signs taken during this treatment. After treatment:  
[x] Patient left in no apparent distress sitting EOB [] Patient left in no apparent distress in bed 
[x] Call bell left within reach [x] Nursing notified 
[] Caregiver present 
[] Bed alarm activated Renita Gaary PTA Time Calculation: 48 mins

## 2018-09-29 NOTE — PROGRESS NOTES
1018 - Bedside report received from [facility]. Patient in bed at this time. Plan of care for the day addressed with the patient. 0745 -PRN Zofran 4mg given PO for nausea. 7287- Pain 7/10. PRN Dilaudid pain medication administered at this time. Patient has been educated on side effects. Patient in bed at this time. A/O x 3. IV to right wrist  intact and patent. Teds to legs. Mepilex dressing to right knee CDI. Denies numbness/tingling. Pedal pulses palpable. Lungs clear. Bowel sounds present to all quadrants. Patient able to get to 2750 on the incentive spirometer. 1334-Pain 8/10. PRN Dilaudid 4mg pain medication administered at this time. Patient has been educated on side effects. 1345-Pt left facility via stretcher and medical transport. 1400-TRANSFER - OUT REPORT: 
 
Verbal report given to nurse Chopra(name) on [facility]enath Eis  [facility] Report consisted of patients Situation, Background, Assessment and  
Recommendations(SBAR). Opportunity for questions and clarification was provided.

## 2018-10-05 ENCOUNTER — HOSPITAL ENCOUNTER (INPATIENT)
Age: 73
LOS: 1 days | Discharge: NURSING, REHAB OR LONG-TERM CARE | DRG: 074 | End: 2018-10-06
Attending: EMERGENCY MEDICINE | Admitting: HOSPITALIST
Payer: MEDICARE

## 2018-10-05 ENCOUNTER — APPOINTMENT (OUTPATIENT)
Dept: MRI IMAGING | Age: 73
DRG: 074 | End: 2018-10-05
Attending: EMERGENCY MEDICINE
Payer: MEDICARE

## 2018-10-05 ENCOUNTER — APPOINTMENT (OUTPATIENT)
Dept: CT IMAGING | Age: 73
DRG: 074 | End: 2018-10-05
Attending: EMERGENCY MEDICINE
Payer: MEDICARE

## 2018-10-05 DIAGNOSIS — E11.9 TYPE 2 DIABETES MELLITUS WITHOUT COMPLICATION, UNSPECIFIED WHETHER LONG TERM INSULIN USE (HCC): ICD-10-CM

## 2018-10-05 DIAGNOSIS — G45.9 TIA (TRANSIENT ISCHEMIC ATTACK): Primary | ICD-10-CM

## 2018-10-05 DIAGNOSIS — E66.9 OBESITY, UNSPECIFIED CLASSIFICATION, UNSPECIFIED OBESITY TYPE, UNSPECIFIED WHETHER SERIOUS COMORBIDITY PRESENT: ICD-10-CM

## 2018-10-05 LAB
ALBUMIN SERPL-MCNC: 2.9 G/DL (ref 3.4–5)
ALBUMIN/GLOB SERPL: 0.6 {RATIO} (ref 0.8–1.7)
ALP SERPL-CCNC: 101 U/L (ref 45–117)
ALT SERPL-CCNC: 44 U/L (ref 16–61)
ANION GAP SERPL CALC-SCNC: 13 MMOL/L (ref 3–18)
APPEARANCE UR: NORMAL
APTT PPP: 21.7 SEC (ref 23–36.4)
AST SERPL-CCNC: 40 U/L (ref 15–37)
BASOPHILS # BLD: 0.1 K/UL (ref 0–0.1)
BASOPHILS NFR BLD: 1 % (ref 0–2)
BILIRUB SERPL-MCNC: 0.4 MG/DL (ref 0.2–1)
BILIRUB UR QL: NEGATIVE
BUN SERPL-MCNC: 53 MG/DL (ref 7–18)
BUN/CREAT SERPL: 22 (ref 12–20)
CALCIUM SERPL-MCNC: 9 MG/DL (ref 8.5–10.1)
CHLORIDE SERPL-SCNC: 101 MMOL/L (ref 100–108)
CK MB CFR SERPL CALC: 0.4 % (ref 0–4)
CK MB SERPL-MCNC: 1.4 NG/ML (ref 5–25)
CK SERPL-CCNC: 369 U/L (ref 39–308)
CO2 SERPL-SCNC: 21 MMOL/L (ref 21–32)
COLOR UR: YELLOW
CREAT SERPL-MCNC: 2.4 MG/DL (ref 0.6–1.3)
DIFFERENTIAL METHOD BLD: ABNORMAL
EOSINOPHIL # BLD: 0.2 K/UL (ref 0–0.4)
EOSINOPHIL NFR BLD: 2 % (ref 0–5)
ERYTHROCYTE [DISTWIDTH] IN BLOOD BY AUTOMATED COUNT: 14.7 % (ref 11.6–14.5)
GLOBULIN SER CALC-MCNC: 4.6 G/DL (ref 2–4)
GLUCOSE BLD STRIP.AUTO-MCNC: 182 MG/DL (ref 70–110)
GLUCOSE SERPL-MCNC: 167 MG/DL (ref 74–99)
GLUCOSE UR STRIP.AUTO-MCNC: NEGATIVE MG/DL
HCT VFR BLD AUTO: 36.1 % (ref 36–48)
HGB BLD-MCNC: 11.8 G/DL (ref 13–16)
HGB UR QL STRIP: NEGATIVE
INR PPP: 1 (ref 0.8–1.2)
KETONES UR QL STRIP.AUTO: NEGATIVE MG/DL
LEUKOCYTE ESTERASE UR QL STRIP.AUTO: NEGATIVE
LYMPHOCYTES # BLD: 2.4 K/UL (ref 0.9–3.6)
LYMPHOCYTES NFR BLD: 31 % (ref 21–52)
MCH RBC QN AUTO: 31.4 PG (ref 24–34)
MCHC RBC AUTO-ENTMCNC: 32.7 G/DL (ref 31–37)
MCV RBC AUTO: 96 FL (ref 74–97)
MONOCYTES # BLD: 0.5 K/UL (ref 0.05–1.2)
MONOCYTES NFR BLD: 7 % (ref 3–10)
NEUTS SEG # BLD: 4.5 K/UL (ref 1.8–8)
NEUTS SEG NFR BLD: 59 % (ref 40–73)
NITRITE UR QL STRIP.AUTO: NEGATIVE
PH UR STRIP: 5 [PH] (ref 5–8)
PLATELET # BLD AUTO: 420 K/UL (ref 135–420)
PMV BLD AUTO: 9.6 FL (ref 9.2–11.8)
POTASSIUM SERPL-SCNC: 4.8 MMOL/L (ref 3.5–5.5)
PROT SERPL-MCNC: 7.5 G/DL (ref 6.4–8.2)
PROT UR STRIP-MCNC: NEGATIVE MG/DL
PROTHROMBIN TIME: 12.9 SEC (ref 11.5–15.2)
RBC # BLD AUTO: 3.76 M/UL (ref 4.7–5.5)
SODIUM SERPL-SCNC: 135 MMOL/L (ref 136–145)
SP GR UR REFRACTOMETRY: 1.02 (ref 1–1.03)
TROPONIN I SERPL-MCNC: <0.02 NG/ML (ref 0–0.06)
UROBILINOGEN UR QL STRIP.AUTO: 1 EU/DL (ref 0.2–1)
WBC # BLD AUTO: 7.7 K/UL (ref 4.6–13.2)

## 2018-10-05 PROCEDURE — 70544 MR ANGIOGRAPHY HEAD W/O DYE: CPT

## 2018-10-05 PROCEDURE — 99285 EMERGENCY DEPT VISIT HI MDM: CPT

## 2018-10-05 PROCEDURE — 74011250637 HC RX REV CODE- 250/637: Performed by: EMERGENCY MEDICINE

## 2018-10-05 PROCEDURE — 74011250637 HC RX REV CODE- 250/637: Performed by: HOSPITALIST

## 2018-10-05 PROCEDURE — 70551 MRI BRAIN STEM W/O DYE: CPT

## 2018-10-05 PROCEDURE — 65660000000 HC RM CCU STEPDOWN

## 2018-10-05 PROCEDURE — 70450 CT HEAD/BRAIN W/O DYE: CPT

## 2018-10-05 PROCEDURE — 85730 THROMBOPLASTIN TIME PARTIAL: CPT | Performed by: EMERGENCY MEDICINE

## 2018-10-05 PROCEDURE — 70547 MR ANGIOGRAPHY NECK W/O DYE: CPT

## 2018-10-05 PROCEDURE — 74011636637 HC RX REV CODE- 636/637: Performed by: HOSPITALIST

## 2018-10-05 PROCEDURE — 81003 URINALYSIS AUTO W/O SCOPE: CPT | Performed by: EMERGENCY MEDICINE

## 2018-10-05 PROCEDURE — 82550 ASSAY OF CK (CPK): CPT | Performed by: EMERGENCY MEDICINE

## 2018-10-05 PROCEDURE — 80053 COMPREHEN METABOLIC PANEL: CPT | Performed by: EMERGENCY MEDICINE

## 2018-10-05 PROCEDURE — 85025 COMPLETE CBC W/AUTO DIFF WBC: CPT | Performed by: EMERGENCY MEDICINE

## 2018-10-05 PROCEDURE — 82962 GLUCOSE BLOOD TEST: CPT

## 2018-10-05 PROCEDURE — 93005 ELECTROCARDIOGRAM TRACING: CPT

## 2018-10-05 PROCEDURE — 74011250636 HC RX REV CODE- 250/636: Performed by: HOSPITALIST

## 2018-10-05 PROCEDURE — 85610 PROTHROMBIN TIME: CPT | Performed by: EMERGENCY MEDICINE

## 2018-10-05 RX ORDER — LISINOPRIL 20 MG/1
40 TABLET ORAL DAILY
Status: DISCONTINUED | OUTPATIENT
Start: 2018-10-06 | End: 2018-10-06 | Stop reason: HOSPADM

## 2018-10-05 RX ORDER — POTASSIUM CHLORIDE 750 MG/1
10 TABLET, EXTENDED RELEASE ORAL DAILY
Status: DISCONTINUED | OUTPATIENT
Start: 2018-10-06 | End: 2018-10-06 | Stop reason: HOSPADM

## 2018-10-05 RX ORDER — NIFEDIPINE 60 MG/1
120 TABLET, EXTENDED RELEASE ORAL DAILY
Status: DISCONTINUED | OUTPATIENT
Start: 2018-10-06 | End: 2018-10-06 | Stop reason: HOSPADM

## 2018-10-05 RX ORDER — SODIUM CHLORIDE 9 MG/ML
75 INJECTION, SOLUTION INTRAVENOUS CONTINUOUS
Status: DISCONTINUED | OUTPATIENT
Start: 2018-10-05 | End: 2018-10-06 | Stop reason: HOSPADM

## 2018-10-05 RX ORDER — OMEPRAZOLE 20 MG/1
20 CAPSULE, DELAYED RELEASE ORAL DAILY
Status: DISCONTINUED | OUTPATIENT
Start: 2018-10-06 | End: 2018-10-06 | Stop reason: HOSPADM

## 2018-10-05 RX ORDER — MAGNESIUM SULFATE 100 %
16 CRYSTALS MISCELLANEOUS AS NEEDED
Status: DISCONTINUED | OUTPATIENT
Start: 2018-10-05 | End: 2018-10-06 | Stop reason: HOSPADM

## 2018-10-05 RX ORDER — ROSUVASTATIN CALCIUM 10 MG/1
40 TABLET, COATED ORAL
Status: DISCONTINUED | OUTPATIENT
Start: 2018-10-05 | End: 2018-10-06 | Stop reason: HOSPADM

## 2018-10-05 RX ORDER — GABAPENTIN 300 MG/1
600 CAPSULE ORAL DAILY
Status: DISCONTINUED | OUTPATIENT
Start: 2018-10-06 | End: 2018-10-06 | Stop reason: HOSPADM

## 2018-10-05 RX ORDER — GUAIFENESIN 100 MG/5ML
325 LIQUID (ML) ORAL
Status: COMPLETED | OUTPATIENT
Start: 2018-10-05 | End: 2018-10-05

## 2018-10-05 RX ORDER — INSULIN GLARGINE 100 [IU]/ML
82 INJECTION, SOLUTION SUBCUTANEOUS 2 TIMES DAILY
Status: DISCONTINUED | OUTPATIENT
Start: 2018-10-05 | End: 2018-10-06 | Stop reason: HOSPADM

## 2018-10-05 RX ORDER — ALLOPURINOL 100 MG/1
100 TABLET ORAL 2 TIMES DAILY
Status: DISCONTINUED | OUTPATIENT
Start: 2018-10-05 | End: 2018-10-06 | Stop reason: HOSPADM

## 2018-10-05 RX ORDER — TAMSULOSIN HYDROCHLORIDE 0.4 MG/1
0.4 CAPSULE ORAL DAILY
Status: DISCONTINUED | OUTPATIENT
Start: 2018-10-06 | End: 2018-10-06 | Stop reason: HOSPADM

## 2018-10-05 RX ORDER — DEXTROSE 50 % IN WATER (D50W) INTRAVENOUS SYRINGE
50 AS NEEDED
Status: DISCONTINUED | OUTPATIENT
Start: 2018-10-05 | End: 2018-10-06 | Stop reason: HOSPADM

## 2018-10-05 RX ORDER — ASPIRIN 325 MG
325 TABLET ORAL 2 TIMES DAILY
Status: DISCONTINUED | OUTPATIENT
Start: 2018-10-05 | End: 2018-10-06 | Stop reason: HOSPADM

## 2018-10-05 RX ORDER — FUROSEMIDE 40 MG/1
40 TABLET ORAL 2 TIMES DAILY
Status: DISCONTINUED | OUTPATIENT
Start: 2018-10-05 | End: 2018-10-06 | Stop reason: HOSPADM

## 2018-10-05 RX ORDER — INSULIN LISPRO 100 [IU]/ML
INJECTION, SOLUTION INTRAVENOUS; SUBCUTANEOUS
Status: DISCONTINUED | OUTPATIENT
Start: 2018-10-05 | End: 2018-10-06 | Stop reason: HOSPADM

## 2018-10-05 RX ADMIN — INSULIN LISPRO 2 UNITS: 100 INJECTION, SOLUTION INTRAVENOUS; SUBCUTANEOUS at 22:04

## 2018-10-05 RX ADMIN — FUROSEMIDE 40 MG: 40 TABLET ORAL at 22:04

## 2018-10-05 RX ADMIN — ASPIRIN 81 MG 324 MG: 81 TABLET ORAL at 15:12

## 2018-10-05 RX ADMIN — SODIUM CHLORIDE 75 ML/HR: 900 INJECTION, SOLUTION INTRAVENOUS at 22:04

## 2018-10-05 RX ADMIN — ALLOPURINOL 100 MG: 100 TABLET ORAL at 22:04

## 2018-10-05 RX ADMIN — ASPIRIN 325 MG ORAL TABLET 325 MG: 325 PILL ORAL at 22:04

## 2018-10-05 RX ADMIN — INSULIN GLARGINE 82 UNITS: 100 INJECTION, SOLUTION SUBCUTANEOUS at 22:03

## 2018-10-05 NOTE — PROGRESS NOTES
1638:  Patient arrived to unit with Madai Cifuentes RN from ED. Bedside report given, skin assessment performed with incision to right knee covered with island dressing from knee surgery performed on 9/26/2018; clean, dry, and intact. Patient has complaints of constipation, has small bowel movement, formed and brown. Urine specimen collected. Patient has no complaints of pain or discomfort at the time. Oriented to room, bed, telephone, television, call bell, and bathroom. Whiteboard updated, bed at the lowest position with call bell within reach.

## 2018-10-05 NOTE — ED NOTES
Pt transported to MRI via stretcher with ED tech. Pt is calm and cooperative, GCS15, with no signs or symptoms of distress noted.

## 2018-10-05 NOTE — PROGRESS NOTES
READ BACK VERIFY TELEPHONE CHANGE OF ORDER TO MRA HEAD W/O CONTRAST. Also asked unit secretary to have nurse get MRI safety screening.

## 2018-10-05 NOTE — PROGRESS NOTES
TRANSFER - IN REPORT: 
 
Verbal report received from Tomasa Liang RN(name) on [facility]  being received from ED(unit) for routine progression of care Report consisted of patients Situation, Background, Assessment and  
Recommendations(SBAR). Information from the following report(s) SBAR, Kardex, ED Summary, Procedure Summary, Intake/Output, MAR, Med Rec Status, Cardiac Rhythm ST and Alarm Parameters  was reviewed with the receiving nurse. Opportunity for questions and clarification was provided. Assessment completed upon patients arrival to unit and care assumed.

## 2018-10-05 NOTE — ED TRIAGE NOTES
Pt arrived from [facility] via EMS c/o numbness to the R arm and hand, onset 45 minutes prior to arrival. Pt verbalizes weakness, denies headache, chest pain, and SOB. Pt recently had a R knee replacement September 26, 2018.

## 2018-10-05 NOTE — ED PROVIDER NOTES
EMERGENCY DEPARTMENT HISTORY AND PHYSICAL EXAM 
 
Date: 10/5/2018 Patient Name: Benigno Mcgee History of Presenting Illness Chief Complaint Patient presents with  Numbness History Provided By: Patient Chief Complaint: Numbness Duration: 45 Minutes Timing:  Acute Location: RUE Quality: Tingling Modifying Factors: No relieving or worsening factors Associated Symptoms: mild tingling and fatigue Additional History (Context):  
2:30 PM 
Benigno Mcgee is a 68 y.o. male with PMHX of GERD, neuropathy, DM, HTN, arthritis, cancer who presents to the emergency department C/O numbness with some weakness to the RUE, onset 45 minutes ago s/p being seen at an orthopedic f/u visit at [facility]. Associated sxs include mild tingling and fatigue. Pt states that he had knee surgery on 09/26/2018 and that he has experienced no comlications. Pt denies CP, feeling of indigestion, hx of CVA, abd pain, and any other sxs or complaints. PCP: Eugenio Alfred MD 
 
Current Facility-Administered Medications Medication Dose Route Frequency Provider Last Rate Last Dose  allopurinol (ZYLOPRIM) tablet 100 mg  100 mg Oral BID Lucas Sawant MD      
 aspirin (ASPIRIN) tablet 325 mg  325 mg Oral BID Lucas Sawant MD      
 furosemide (LASIX) tablet 40 mg  40 mg Oral BID MD Werner Shaw.Ethan Gale ON 10/6/2018] gabapentin (NEURONTIN) capsule 600 mg  600 mg Oral DAILY Lucas Sawant MD      
 . PHARMACY TO SUBSTITUTE PER PROTOCOL (Reordered from: gabapentin (NEURONTIN) 600 mg tablet)    Per Protocol Lucas Sawant MD      
 . PHARMACY TO SUBSTITUTE PER PROTOCOL (Reordered from: insulin glargine (LANTUS SOLOSTAR) 100 unit/mL (3 mL) pen)    Per Protocol MD Werner Shaw.Ethan Townsendttsteven Gale ON 10/6/2018] lisinopril (PRINIVIL, ZESTRIL) tablet 40 mg  40 mg Oral DAILY Lucas Sawant MD      
 . PHARMACY TO SUBSTITUTE PER PROTOCOL (Reordered from: NIFEdipine CR (ADALAT CC) 60 mg CR tablet)    Per Protocol Jessenia Busby MD      
 [facility] ON 10/6/2018] omeprazole (PRILOSEC) capsule 20 mg  20 mg Oral DAILY Jessenia Busby MD      
[address] [place] [address] ON 10/6/2018] potassium chloride (KLOR-CON) tablet 10 mEq  10 mEq Oral DAILY Jessenia Busby MD      
 rosuvastatin (CRESTOR) tablet 40 mg  40 mg Oral QHS Jessenia Busby MD      
[address] [place] [address] ON 10/6/2018] tamsulosin (FLOMAX) capsule 0.4 mg  0.4 mg Oral DAILY Jessenia Busby MD      
 0.9% sodium chloride infusion  75 mL/hr IntraVENous CONTINUOUS Jessenia Busby MD      
 glucose chewable tablet 16 g  16 g Oral PRN Jessenia Busby MD      
 glucagon (GLUCAGEN) injection 1 mg  1 mg IntraMUSCular PRN Jessenia Busby MD      
 dextrose (D50W) injection syrg 25 g  50 mL IntraVENous PRN Jessenia Busby MD      
 insulin lispro (HUMALOG) injection   SubCUTAneous AC&HS Jessenia Busby MD      
 
 
Past History Past Medical History: 
Past Medical History:  
Diagnosis Date  Agent orange exposure  Arthritis   
 osteo  Cancer ([facility] [address] [facility]  Diabetes ([facility] [address]  Diabetes mellitus  Edema   
 legs  Essential hypertension  GERD (gastroesophageal reflux disease)  Hypertension  Insomnia  Morbid obesity ([facility] [address]  Neuropathy  Neuropathy 1984  Pituitary adenoma ([facility] [address]  Skin cancer  Unspecified sleep apnea   
 uses CPAP Past Surgical History: 
Past Surgical History:  
Procedure Laterality Date  HX ADENOIDECTOMY  HX HEENT    
 sinus, deviated septum repair  HX KNEE REPLACEMENT Left 2014  HX KNEE REPLACEMENT Right  HX MASTECTOMY Left   
 partial  
 HX ORTHOPAEDIC Left   
 arm- fracture  HX ORTHOPAEDIC Left   
 hardware removal  
 HX OTHER SURGICAL    
 lumps removed from earlobes  HX OTHER SURGICAL    
 numerous lumps removed from various sites  HX OTHER SURGICAL    
 BCC removed  HX TONSILLECTOMY  HX VASECTOMY Family History: No family history on file. Social History: 
Social History Substance Use Topics  Smoking status: Former Smoker Packs/day: 1.50 Years: 20.00 Types: Cigarettes Quit date: 1/1/1996  Smokeless tobacco: Never Used  Alcohol use Yes Comment: rarely Allergies: 
No Known Allergies Review of Systems Review of Systems Constitutional: Negative for fever. Cardiovascular: Negative for chest pain. Gastrointestinal: Negative for abdominal pain. Neurological: Positive for weakness and numbness. Negative for headaches. All other systems reviewed and are negative. Physical Exam  
 
Vitals:  
 10/05/18 1515 10/05/18 1715 10/05/18 1730 10/05/18 1750 BP: 128/52 132/58 123/63 114/61 Pulse: 67 Resp: 17 16 16 16 Temp:      
SpO2: 100% 99% 99% 98% Weight:      
Height:      
 
Physical Exam 
Constitutional: Elderly obese  male in NAD Head: Normocephalic, Atraumatic Eyes: Pupils are equal, round, and reactive to light, EOMI, no scleral icterus, no conjunctival pallor ENT: moist mucous membranes, hearing intact Neck: Supple, non-tender Cardiovascular: Regular rate and rhythm, no murmurs, rubs, or gallops Chest: Normal work of breathing and chest excursion bilaterally Lungs: Clear to ausculation bilaterally, no wheezes, no rhonchi, mild bibasilar crackles Abdomen: Soft, non tender, non distended, normoactive bowel sounds Back: No evidence of trauma or deformity Extremities: No evidence of trauma or deformity, no LE edema Skin: Warm and dry, normal cap refill Neuro: Alert and appropriate, CN intact, normal speech, moving all 4 extremities freely and symmetrically, finger to nose intact bilaterally, NIH of 1 for sensation changes Psychiatric: Cooperative, appropriate mood Diagnostic Study Results Labs - Recent Results (from the past 12 hour(s)) EKG, 12 LEAD, INITIAL Collection Time: 10/05/18  3:02 PM  
Result Value Ref Range Ventricular Rate 64 BPM  
 Atrial Rate 64 BPM  
 P-R Interval 190 ms QRS Duration 88 ms Q-T Interval 404 ms QTC Calculation (Bezet) 416 ms Calculated P Axis 84 degrees Calculated R Axis 80 degrees Calculated T Axis 62 degrees Diagnosis Normal sinus rhythm Normal ECG When compared with ECG of 12-SEP-2018 14:18, 
NV interval has decreased CBC WITH AUTOMATED DIFF Collection Time: 10/05/18  3:10 PM  
Result Value Ref Range WBC 7.7 4.6 - 13.2 K/uL  
 RBC 3.76 (L) 4.70 - 5.50 M/uL  
 HGB 11.8 (L) 13.0 - 16.0 g/dL HCT 36.1 36.0 - 48.0 % MCV 96.0 74.0 - 97.0 FL  
 MCH 31.4 24.0 - 34.0 PG  
 MCHC 32.7 31.0 - 37.0 g/dL  
 RDW 14.7 (H) 11.6 - 14.5 % PLATELET 555 353 - 478 K/uL MPV 9.6 9.2 - 11.8 FL  
 NEUTROPHILS 59 40 - 73 % LYMPHOCYTES 31 21 - 52 % MONOCYTES 7 3 - 10 % EOSINOPHILS 2 0 - 5 % BASOPHILS 1 0 - 2 %  
 ABS. NEUTROPHILS 4.5 1.8 - 8.0 K/UL  
 ABS. LYMPHOCYTES 2.4 0.9 - 3.6 K/UL  
 ABS. MONOCYTES 0.5 0.05 - 1.2 K/UL  
 ABS. EOSINOPHILS 0.2 0.0 - 0.4 K/UL  
 ABS. BASOPHILS 0.1 0.0 - 0.1 K/UL  
 DF AUTOMATED    
PTT Collection Time: 10/05/18  3:10 PM  
Result Value Ref Range aPTT 21.7 (L) 23.0 - 36.4 SEC PROTHROMBIN TIME + INR Collection Time: 10/05/18  3:10 PM  
Result Value Ref Range Prothrombin time 12.9 11.5 - 15.2 sec INR 1.0 0.8 - 1.2 Radiologic Studies -  
CT HEAD WO CONT Final Result IMPRESSION: 
1. No acute intracranial pathology. As read by the radiologist.  
MRI BRAIN WO CONT    (Results Pending) MRA BRAIN WO CONT    (Results Pending) MRA NECK WO CONT    (Results Pending) CT Results  (Last 48 hours) 10/05/18 1442  CT HEAD WO CONT Final result Impression:  IMPRESSION:  
   
1. No acute intracranial pathology. Above code S stroke alert results discussed with the ER attending at 2:45 PM  
  
 Narrative:  EXAMINATION:  CT head noncontrast  
   
COMPARISON: None HISTORY: CODE S stroke alert TECHNIQUE: Noncontrasted axial images were obtained through the patient's brain  
from the vertex of the skull through the skull base. Bone and soft tissue  
windows were reviewed. One or more dose reduction techniques were used on this CT: automated exposure  
control, adjustment of the mAs and/or kVp according to patient's size, and  
iterative reconstruction techniques. The specific techniques utilized on this CT  
exam have been documented in patient's electronic medical record. FINDINGS: mass effect, midline shift or hemorrhage. Ventricles are normal in  
size, position and configuration for patient's age. No abnormal extra-axial  
fluid collections are seen. No territorial signs of infarct. The bony calvarium  
appears intact without acute displaced fracture. The visualized paranasal  
sinuses and mastoid air cells are aerated. CXR Results  (Last 48 hours) None 6:44 PM 
MRA neck w/o contrast shows IMPRESSION: 1.  No hemodynamically significant carotid stenosis based on NASCET criteria. 2.  No vertebral artery stenosis is seen. As read by the radiologist. 
 
MRA brain w/o contrast shows IMPRESSION: 1. No high-grade intracranial stenosis. 2. Slight irregularity with eccentric dilatation along the left lateral aspect of distal basilar artery perhaps related to administration.  Small broad-based, 
fusiform aneurysm is possible. No saccular aneurysm is seen. As read by the radiologist. 
 
MRI brain w/o contrast shows IMPRESSION: 1.  No acute infarct, hemorrhage, mass effect, or herniation. 2. Minimal nonspecific white matter disease likely representing chronic small vessel changes. 3. Known left lateral pituitary lesion is not well seen on this routine brain study. As read by the radiologist. 
 
Medications given in the ED- Medications  
allopurinol (ZYLOPRIM) tablet 100 mg (not administered) aspirin (ASPIRIN) tablet 325 mg (not administered)  
furosemide (LASIX) tablet 40 mg (not administered)  
gabapentin (NEURONTIN) capsule 600 mg (not administered) [facility] PHARMACY TO SUBSTITUTE PER PROTOCOL (Reordered from: gabapentin (NEURONTIN) 600 mg tablet) (not administered) [facility] PHARMACY TO SUBSTITUTE PER PROTOCOL (Reordered from: insulin glargine (LANTUS SOLOSTAR) 100 unit/mL (3 mL) pen) (not administered)  
lisinopril (PRINIVIL, ZESTRIL) tablet 40 mg (not administered) [facility] PHARMACY TO SUBSTITUTE PER PROTOCOL (Reordered from: NIFEdipine CR (ADALAT CC) 60 mg CR tablet) (not administered) omeprazole (PRILOSEC) capsule 20 mg (not administered) potassium chloride (KLOR-CON) tablet 10 mEq (not administered)  
rosuvastatin (CRESTOR) tablet 40 mg (not administered)  
tamsulosin (FLOMAX) capsule 0.4 mg (not administered) 0.9% sodium chloride infusion (not administered) glucose chewable tablet 16 g (not administered) glucagon (GLUCAGEN) injection 1 mg (not administered) dextrose (D50W) injection syrg 25 g (not administered)  
insulin lispro (HUMALOG) injection (not administered) aspirin chewable tablet 324 mg (324 mg Oral Given 10/5/18 1512) Medical Decision Making I am the first provider for this patient. I reviewed the vital signs, available nursing notes, past medical history, past surgical history, family history and social history. Vital Signs-Reviewed the patient's vital signs. Pulse Oximetry Analysis - 100% on RA Cardiac Monitor: 
Rate: 81 bpm 
Rhythm: NSR 
 
EKG interpretation: (Preliminary) 2:30 PM  
81 bpm. NSR. QT/QTc at 370/406 ms. No VALERY. 
EKG read by General Flavio DO at 2:36 PM  
 
EKG interpretation: (Secondary) 3:02 PM  
64 bpm. NSR. QT/QTc at 404/416 ms. No VALERY. 
EKG read by General Flavio DO at 3:07 PM 
 
Records Reviewed: Nursing Notes and Old Medical Records Provider Notes (Medical Decision Making): 67 y/o male w/ hs of DM, HTN, and recent knee surgery 10 days ago who present with numbness to RUE. On exam, NIH is 1 and finger to nose test is intact bilaterally. Pt is high risk d/t co-morbidities and recent surgery. Procedures: 
Procedures ED Course:  
2:30 PM Initial assessment performed. The patients presenting problems have been discussed, and they are in agreement with the care plan formulated and outlined with them. I have encouraged them to ask questions as they arise throughout their visit. 2:59 PM Discussed patient's history, exam, and available diagnostics results with Dr. Tatiana Gaucher, neurology, who agrees with MRI of head and neck, hydration, and admission if negative. Diagnosis and Disposition CONSULT NOTE:  
5:20 PM 
MRI showing no acute stroke. MRA neck showing no vertebral or carotid stenosis. Will admit for TIA workup. William Muniz DO spoke with Viridiana Thompson MD  
Specialty: Internal Medicine Discussed pt's hx, disposition, and available diagnostic and imaging results. Reviewed care plans. Consulting physician agrees with plans as outlined. Will admit to Telemetry. Written by Kitty Heard ED Scribe, as dictated by William Muniz DO 
 
ADMISSION NOTE: 
5:23 PM 
Patient is being admitted to the hospital by Viridiana Thompson MD. The results of their tests and reasons for their admission have been discussed with them and/or available family. They convey agreement and understanding for the need to be admitted and for their admission diagnosis. Written by Kitty Heard ED Scribe, as dictated by William Muniz DO. 
 
CONDITIONS ON ADMISSION: 
Sepsis is not present at the time of admission. Deep Vein Thrombosis is not present at the time of admission. Thrombosis is not present at the time of admission. Urinary Tract Infection is not present at the time of admission. Pneumonia is not present at the time of admission.  MRSA is not present at the time of admission. Wound infection is not present at the time of admission. Pressure Ulcer is not present at the time of admission. CLINICAL IMPRESSION 1. TIA (transient ischemic attack) 2. Obesity, unspecified classification, unspecified obesity type, unspecified whether serious comorbidity present 3. Type 2 diabetes mellitus without complication, unspecified whether long term insulin use (HCC)   
 
_______________________________ Attestations: This note is prepared by Kira Martínez, acting as Scribe for Jett Chacon DO. Jett Chacon DO:  The scribe's documentation has been prepared under my direction and personally reviewed by me in its entirety. I confirm that the note above accurately reflects all work, treatment, procedures, and medical decision making performed by me. 
_______________________________

## 2018-10-05 NOTE — IP AVS SNAPSHOT
[address] [place] [id number] 
[phone] Patient: Natalie Johns MRN: [id number] A check liane indicates which time of day the medication should be taken. My Medications CONTINUE taking these medications Instructions Each Dose to Equal  
 Morning Noon Evening Bedtime ALBUTEROL SULFATE IN Notes to Patient:  Take as needed. Take  by inhalation as needed. allopurinol 100 mg tablet Commonly known as:  Heber Cassette Your last dose was:  10/6/18 Your next dose is:  10/6/18 Take 100 mg by mouth two (2) times a day. 100 mg  
    
   
   
   
9: 00 P. M. AMARYL 2 mg tablet Generic drug:  glimepiride Notes to Patient:  Take as prescribed. Take 4 mg by mouth two (2) times a day. Indications: type 2 diabetes mellitus 4 mg  
    
   
   
   
  
 aspirin 325 mg tablet Commonly known as:  ASPIRIN Your last dose was:  10/6/18 Your next dose is:  10/6/18 Take 1 Tab by mouth two (2) times a day. Take for 3 weeks for DVT prophylaxis. 325 mg  
    
   
   
   
9: 00 P. M.  
  
 CRESTOR 40 mg tablet Generic drug:  rosuvastatin Your last dose was:  10/6/18 Your next dose is:  10/6/18 Take 40 mg by mouth nightly. NON FORMULARY  Indications: HYPERCHOLESTEROLEMIA 40 mg  
    
   
   
   
10:00 P. M.  
  
 HYDROmorphone 2 mg tablet Commonly known as:  DILAUDID Notes to Patient:  Take as needed. Take 1-2 Tabs by mouth every four (4) hours as needed for Pain. Max Daily Amount: 24 mg.  
 2-4 mg KLOR-CON 8 8 mEq tablet Generic drug:  potassium chloride SR Your last dose was:  10/6/18 Your next dose is:  10/7/18 Take 8 mEq by mouth daily. 8 mEq LANTUS SOLOSTAR U-100 INSULIN 100 unit/mL (3 mL) Inpn Generic drug:  insulin glargine 82 Units by SubCUTAneous route two (2) times a day. Indications: type 2 diabetes mellitus 82 Units 9: 00 P. M.  
  
 LASIX 40 mg tablet Generic drug:  furosemide Your last dose was:  10/6/18 Your next dose is:  10/6/18 Take 40 mg by mouth two (2) times a day. Indications: EDEMA 40 mg  
    
   
   
   
9: 00 P. M.  
  
 lisinopril 40 mg tablet Commonly known as:  Gretchen Fossa Your last dose was:  10/6/18 Your next dose is:  10/7/18 Take 40 mg by mouth daily. Indications: hypertension 40 mg  
    
  
   
   
   
  
 naloxone 4 mg/actuation nasal spray Commonly known as:  [employer] Notes to Patient:  Take as needed. Use 1 spray intranasally, then discard. Repeat with new spray every 2 min as needed for opioid overdose symptoms, alternating nostrils. * NEURONTIN 300 mg capsule Generic drug:  gabapentin Your last dose was:  10/6/18 Your next dose is:  10/7/18 Take 600 mg by mouth daily. Indications: NEUROPATHIC PAIN  
 600 mg  
    
  
   
   
   
  
 * gabapentin 600 mg tablet Commonly known as:  NEURONTIN Notes to Patient:  Take as prescribed Take 1,200 mg by mouth nightly. 1200 mg NIFEdipine ER 60 mg ER tablet Commonly known as:  ADALAT CC Take 120 mg by mouth daily. Pt instructed to take am of surgery. Indications: hypertension 120 mg  
    
   
   
   
  
 PriLOSEC 20 mg capsule Generic drug:  omeprazole Your last dose was:  10/6/18 Your next dose is:  10/7/18 Take 20 mg by mouth daily. Pt instructed to take am of surgery. Indications: GASTROESOPHAGEAL REFLUX  
 20 mg  
    
  
   
   
   
  
 psyllium Powd Commonly known as:  METAMUCIL Notes to Patient:  Take as needed. Take  by mouth as needed. SUPER B COMPLEX PO Notes to Patient:  Take as prescribed. Take  by mouth daily. tamsulosin 0.4 mg capsule Commonly known as:  FLOMAX Your last dose was:  10/6/18 Your next dose is:  10/7/18 Take 0.4 mg by mouth daily. 0.4 mg  
    
  
   
   
   
  
 * Notice: This list has 2 medication(s) that are the same as other medications prescribed for you. Read the directions carefully, and ask your doctor or other care provider to review them with you. STOP taking these medications COZAAR 50 mg tablet Generic drug:  losartan

## 2018-10-05 NOTE — IP AVS SNAPSHOT
[address] [address] 
[phone] Patient: Bambi Coleman MRN: [id number] REQ:[id number] About your hospitalization You were admitted on:  October 5, 2018 You last received care in the:  [address] [place] [address] You were discharged on:  October 6, 2018 Why you were hospitalized Your primary diagnosis was:  Not on File Your diagnoses also included:  Tia (Transient Ischemic Attack) Follow-up Information Follow up With Details Comments Contact Info Jennifer Hernandez MD   [address] [facility] [address] [address] 
[phone] Ishan COLLINS [facility]   [address] [facility] 
[phone] Discharge Orders None A check liane indicates which time of day the medication should be taken. My Medications CONTINUE taking these medications Instructions Each Dose to Equal  
 Morning Noon Evening Bedtime ALBUTEROL SULFATE IN Notes to Patient:  Take as needed. Take  by inhalation as needed. allopurinol 100 mg tablet Commonly known as:  Ilona Seed Your last dose was:  10/6/18 Your next dose is:  10/6/18 Take 100 mg by mouth two (2) times a day. 100 mg  
    
   
   
   
9: 00 P. M. AMARYL 2 mg tablet Generic drug:  glimepiride Notes to Patient:  Take as prescribed. Take 4 mg by mouth two (2) times a day. Indications: type 2 diabetes mellitus 4 mg  
    
   
   
   
  
 aspirin 325 mg tablet Commonly known as:  ASPIRIN Your last dose was:  10/6/18 Your next dose is:  10/6/18 Take 1 Tab by mouth two (2) times a day. Take for 3 weeks for DVT prophylaxis. 325 mg  
    
   
   
   
9: 00 P. M.  
  
 CRESTOR 40 mg tablet Generic drug:  rosuvastatin Your last dose was:  10/6/18 Your next dose is:  10/6/18 Take 40 mg by mouth nightly. NON FORMULARY  Indications: HYPERCHOLESTEROLEMIA 40 mg  
    
   
   
   
10:00 P. M.  
  
 HYDROmorphone 2 mg tablet Commonly known as:  DILAUDID Notes to Patient:  Take as needed. Take 1-2 Tabs by mouth every four (4) hours as needed for Pain. Max Daily Amount: 24 mg.  
 2-4 mg KLOR-CON 8 8 mEq tablet Generic drug:  potassium chloride SR Your last dose was:  10/6/18 Your next dose is:  10/7/18 Take 8 mEq by mouth daily. 8 mEq LANTUS SOLOSTAR U-100 INSULIN 100 unit/mL (3 mL) Inpn Generic drug:  insulin glargine 82 Units by SubCUTAneous route two (2) times a day. Indications: type 2 diabetes mellitus 82 Units 9: 00 P. M.  
  
 LASIX 40 mg tablet Generic drug:  furosemide Your last dose was:  10/6/18 Your next dose is:  10/6/18 Take 40 mg by mouth two (2) times a day. Indications: EDEMA 40 mg  
    
   
   
   
9: 00 P. M.  
  
 lisinopril 40 mg tablet Commonly known as:  Charm Romano Your last dose was:  10/6/18 Your next dose is:  10/7/18 Take 40 mg by mouth daily. Indications: hypertension 40 mg  
    
  
   
   
   
  
 naloxone 4 mg/actuation nasal spray Commonly known as:  [employer] Notes to Patient:  Take as needed. Use 1 spray intranasally, then discard. Repeat with new spray every 2 min as needed for opioid overdose symptoms, alternating nostrils. * NEURONTIN 300 mg capsule Generic drug:  gabapentin Your last dose was:  10/6/18 Your next dose is:  10/7/18 Take 600 mg by mouth daily. Indications: NEUROPATHIC PAIN  
 600 mg  
    
  
   
   
   
  
 * gabapentin 600 mg tablet Commonly known as:  NEURONTIN Notes to Patient:  Take as prescribed Take 1,200 mg by mouth nightly. 1200 mg NIFEdipine ER 60 mg ER tablet Commonly known as:  ADALAT CC  
   
 Take 120 mg by mouth daily. Pt instructed to take am of surgery. Indications: hypertension 120 mg  
    
   
   
   
  
 PriLOSEC 20 mg capsule Generic drug:  omeprazole Your last dose was:  10/6/18 Your next dose is:  10/7/18 Take 20 mg by mouth daily. Pt instructed to take am of surgery. Indications: GASTROESOPHAGEAL REFLUX  
 20 mg  
    
  
   
   
   
  
 psyllium Powd Commonly known as:  METAMUCIL Notes to Patient:  Take as needed. Take  by mouth as needed. SUPER B COMPLEX PO Notes to Patient:  Take as prescribed. Take  by mouth daily. tamsulosin 0.4 mg capsule Commonly known as:  FLOMAX Your last dose was:  10/6/18 Your next dose is:  10/7/18 Take 0.4 mg by mouth daily. 0.4 mg  
    
  
   
   
   
  
 * Notice: This list has 2 medication(s) that are the same as other medications prescribed for you. Read the directions carefully, and ask your doctor or other care provider to review them with you. STOP taking these medications COZAAR 50 mg tablet Generic drug:  losartan Opioid Education Prescription Opioids: What You Need to Know: 
 
 
 
F-face looks uneven A-arms unable to move or move unevenly S-speech slurred or non-existent T-time-call 911 as soon as signs and symptoms begin-DO NOT go Back to bed or wait to see if you get better-TIME IS BRAIN.  
 
Warning Signs of HEART ATTACK  
 
 Call 911 if you have these symptoms: 
? Chest discomfort. Most heart attacks involve discomfort in the center of the chest that lasts more than a few minutes, or that goes away and comes back. It can feel like uncomfortable pressure, squeezing, fullness, or pain. ? Discomfort in other areas of the upper body. Symptoms can include pain or discomfort in one or both arms, the back, neck, jaw, or stomach. ? Shortness of breath with or without chest discomfort. ? Other signs may include breaking out in a cold sweat, nausea, or lightheadedness. Don't wait more than five minutes to call [address]! Fast action can save your life. Calling 911 is almost always the fastest way to get lifesaving treatment. Emergency Medical Services staff can begin treatment when they arrive  up to an hour sooner than if someone gets to the hospital by car. The discharge information has been reviewed with the {PATIENT PARENT GUARDIAN:71232}. The {PATIENT PARENT GUARDIAN:50792} verbalized understanding. Discharge medications reviewed with the {Dishcarge meds reviewed [facility]:59323} and appropriate educational materials and side effects teaching were provided. ___________________________________________________________________________________________________________________________________ Learning About Transient Ischemic Attack (TIA) What is a TIA? A transient ischemic attack (TIA) means that the blood flow to a part of the brain is blocked for a short time. A TIA feels like a stroke but usually lasts only 10 to 20 minutes. Unlike a stroke, a TIA does not cause lasting brain damage. A TIA is usually caused by a blood clot that blocks blood flow in the brain. A blood clot can form in another part of the body (often the heart) and travel through the bloodstream to the brain.  When blood flow to part of the brain is blocked, the brain cells in that area are affected within seconds. This causes symptoms in parts of the body controlled by those brain cells. When the blood clot dissolves, blood flow returns, and the symptoms go away. Blood clots can be the result of hardening of the arteries (atherosclerosis) or a heart attack. Sometimes a TIA is caused by a sharp drop in blood pressure that reduces blood flow to the brain. This is called a \"low-flow\" TIA. It is not as common as a TIA caused by a blood clot. What happens after a TIA? TIA is a serious warning sign of a possible stroke in the future. If you have other medical conditions such as coronary artery disease or atherosclerosis, you may also have an increased risk for a heart attack. Talk to your doctor about your risk. Understanding your risk will help you and your doctor plan your treatment options. You can do a lot to lower your chance of having another TIA or a stroke. Medicines can help, and you may also need to make lifestyle changes. What are the symptoms? Symptoms of a TIA are the same as symptoms of a stroke. But symptoms of a TIA don't last very long. Most of the time, they go away in 10 to 20 minutes. They may include: 
· Sudden numbness, tingling, weakness, or loss of movement in your face, arm, or leg, especially on only one side of your body. · Sudden vision changes. · Sudden trouble speaking. · Sudden confusion or trouble understanding simple statements. · Sudden problems with walking or balance. If you have any of these symptoms, call 911 or other emergency services right away. Ask your family, friends, and coworkers to learn the signs of a TIA. They may notice these signs before you do. Make sure they know to call 911 if these signs appear. How is a TIA treated? If you've had a TIA, you may need more testing and treatment after you get checked by your doctor. If you have a high risk of stroke, you may have to stay in the hospital for treatment. Your treatment for a TIA may include taking medicines to prevent blood clots or a stroke, or having surgery to reopen narrow arteries. How can you prevent another TIA? · Work with your doctor to treat any health problems you have. High blood pressure, high cholesterol, atrial fibrillation, and diabetes all raise your chances of having a stroke. · Be safe with medicines. Take your medicine exactly as prescribed. Call your doctor if you think you are having a problem with your medicine. · Have a healthy lifestyle. ¨ Do not smoke or allow others to smoke around you. If you need help quitting, talk to your doctor about stop-smoking programs and medicines. These can increase your chances of quitting for good. Smoking makes a stroke more likely. ¨ Limit alcohol to 2 drinks a day for men and 1 drink a day for women. ¨ Lose weight if you need to. A healthy weight will help you keep your heart and body healthy. ¨ Be active. Ask your doctor what type and level of activity are safe for you. ¨ Eat heart-healthy foods, like fruits, vegetables, and high-fiber foods. Follow-up care is a key part of your treatment and safety. Be sure to make and go to all appointments, and call your doctor if you are having problems. It's also a good idea to know your test results and keep a list of the medicines you take. Where can you learn more? Go to http://marlena-dayo.info/. Enter J695 in the search box to learn more about \"Learning About Transient Ischemic Attack (TIA). \" 
Current as of: November 21, 2017 Content Version: 11.8 © 6793-4454 Healthwise, Incorporated. Care instructions adapted under license by [employer] ([employer]) (which disclaims liability or warranty for this information). If you have questions about a medical condition or this instruction, always ask your healthcare professional. [address] any warranty or liability for your use of this information. [employer] Announcement We are excited to announce that we are making your provider's discharge notes available to you in [employer]. You will see these notes when they are completed and signed by the physician that discharged you from your recent hospital stay. If you have any questions or concerns about any information you see in [employer], please call the Health Information Department where you were seen or reach out to your Primary Care Provider for more information about your plan of care. Introducing [facility]! Archana Murray introduces [employer] patient portal. Now you can access parts of your medical record, email your doctor's office, and request medication refills online. 1. In your internet browser, go to [email or web address] 2. Click on the First Time User? Click Here link in the Sign In box. You will see the New Member Sign Up page. 3. Enter your [employer] Access Code exactly as it appears below. You will not need to use this code after youve completed the sign-up process. If you do not sign up before the expiration date, you must request a new code. · [employer] Access Code: [email or web address] Expires: 12/25/2018  9:05 AM 
 
4. Enter the last four digits of your Social Security Number (xxxx) and Date of Birth (mm/dd/yyyy) as indicated and click Submit. You will be taken to the next sign-up page. 5. Create a [employer] ID. This will be your [employer] login ID and cannot be changed, so think of one that is secure and easy to remember. 6. Create a [employer] password. You can change your password at any time. 7. Enter your Password Reset Question and Answer. This can be used at a later time if you forget your password. 8. Enter your e-mail address. You will receive e-mail notification when new information is available in [address] 9. Click Sign Up. You can now view and download portions of your medical record. 10. Click the Download Summary menu link to download a portable copy of your medical information. If you have questions, please visit the Frequently Asked Questions section of the [employer] website. Remember, [employer] is NOT to be used for urgent needs. For medical emergencies, dial 911. Now available from your iPhone and Android! Introducing Warren Yepez As a [employer] patient, I wanted to make you aware of our electronic visit tool called Warren Yepez. [employer] 24/7 allows you to connect within minutes with a medical provider 24 hours a day, seven days a week via a mobile device or tablet or logging into a secure website from your computer. You can access Warren Yepez from anywhere in the [place]. A virtual visit might be right for you when you have a simple condition and feel like you just dont want to get out of bed, or cant get away from work for an appointment, when your regular [employer] provider is not available (evenings, weekends or holidays), or when youre out of town and need minor care. Electronic visits cost only $49 and if the [employer] 24/7 provider determines a prescription is needed to treat your condition, one can be electronically transmitted to a nearby pharmacy*. Please take a moment to enroll today if you have not already done so. The enrollment process is free and takes just a few minutes. To enroll, please download the [employer] 24/7 blas to your tablet or phone, or visit [email or web address] to enroll on your computer. And, as an [address] [place] [address] patient with a [employer] account, the results of your visits will be scanned into your electronic medical record and your primary care provider will be able to view the scanned results. We urge you to continue to see your regular [employer] provider for your ongoing medical care.   And while your primary care provider may not be the one available when you seek a Warren Aragonnixon virtual visit, the peace of mind you get from getting a real diagnosis real time can be priceless. For more information on Warren Garciafin, view our Frequently Asked Questions (FAQs) at [email or web address]. Sincerely, 
 
Gaston Hall MD 
Chief Medical Officer Yuri Robles *:  certain medications cannot be prescribed via Warren Aragonnixon Unresulted Labs-Please follow up with your PCP about these lab tests Order Current Status EKG, 12 LEAD, INITIAL Preliminary result Providers Seen During Your Hospitalization Provider Specialty Primary office phone Nora Lorenzo DO Emergency Medicine [phone] Best Miguel MD [facility] [phone] Your Primary Care Physician (PCP) Primary Care Physician Office Phone Office Fax [phone] [facility] [phone] You are allergic to the following No active allergies Recent Documentation Height Weight BMI Smoking Status 1.854 m 145.2 kg 42.22 kg/m2 Former Smoker Emergency Contacts Name Discharge Info Relation Home Work Mobile [phone]  CAREGIVER [3] Spouse [[phone] Patient Belongings The following personal items are in your possession at time of discharge: 
  Dental Appliances: None  Visual Aid: Glasses      Home Medications: None      Clothing: None    Other Valuables: Cell Phone  Personal Items Sent to Safe: none Please provide this summary of care documentation to your next provider. Signatures-by signing, you are acknowledging that this After Visit Summary has been reviewed with you and you have received a copy. Patient Signature:  ____________________________________________________________  Date:  ____________________________________________________________  
  
Carolina Ramos    
    
 Provider Signature:  ____________________________________________________________ Date:  ____________________________________________________________

## 2018-10-05 NOTE — ROUTINE PROCESS
TRANSFER - IN REPORT: 
 
Informed floor that SBAR is ready on [facility]  being received from telemetry for routine progression of care Report consisted of patients Situation, Background, Assessment and  
Recommendations(SBAR). Information from the following report(s) SBAR was reviewed with the receiving nurse. Opportunity for questions and clarification was provided. Assessment completed upon patients arrival to unit and care assumed. Pt to be transported via stretcher with ED nurse; on cardiac monitor.

## 2018-10-05 NOTE — H&P
History & Physical 
 
Patient: Felix Sunshine MRN: [id number]  CSN: [id number] YOB: 1945  Age: 68 y.o. Sex: male DOA: 10/5/2018 Primary Care Provider:  Lety Saxena MD 
 
 
Assessment/Plan Patient Active Problem List  
Diagnosis Code  Hypertension I10  
 Gastroesophageal reflux disease K21.9  Diabetes mellitus ([facility] E11.9  Sleep apnea G47.30  Hypercholesteremia E78.00  Left knee DJD M17.12  
 Primary osteoarthritis of right knee M17.11  
 Right knee DJD M17.11  
 Orthostatic dizziness R42  TIA (transient ischemic attack) G45.9 Admit to telemetry MRI of head and MRA of head and neck with no acute findings Will getEcho 2d doppler Get fasting lipid panel, HbA1c Continue with antiplatelet agent, he is on aspirin 325mg bid for DVT prophylaxis post knee surgery. Regular neurochecks q4 hrly Start the patient on iv fluids Will keep the patient on telemetry to rule out any arrythmias HTN - continue home medications, BP controlled. DM - continue lantus, start on SSI, diabetic diet. Recent left knee surgery - continue with PT/OT 
 
GERD - on PPI Estimated length of stay : 1-2 days CC: right upper ext numbness HPI:  
 
Felix Sunshine is a 68 y.o. male who has past history of DM, HTN, hyperlipidemia, recent left knee surgery is sent to ER from rehab with concerns of right upper ext numbness. Patient reports that he started feeling numbness of his RUE this afternoon, he also felt dizzy. He denies any unilateral weakness, vision changes, LOC of bowel or bladder. No chest pain, SOB. He had recent left knee surgery and is at rehab. He was discharged on aspirin 325mg bid for DVT prophylaxis. In ER code S called, initial CT head negative. His MRI brain and MRA head and neck with no acute findings. His symptoms resolved. Past Medical History:  
Diagnosis Date  Agent orange exposure  Arthritis   
 osteo  Cancer ([facility]  Diabetes ([facility]  Diabetes mellitus  Edema   
 legs  Essential hypertension  GERD (gastroesophageal reflux disease)  Hypertension  Insomnia  Mor[facility]  Neuropathy  Neuropathy 1984  Pituitary adenoma ([facility]  Skin cancer  Unspecified sleep apnea   
 uses CPAP Past Surgical History:  
Procedure Laterality Date  HX ADENOIDECTOMY  HX HEENT    
 sinus, deviated septum repair  HX KNEE REPLACEMENT Left 2014  HX KNEE REPLACEMENT Right  HX MASTECTOMY Left   
 partial  
 HX ORTHOPAEDIC Left   
 arm- fracture  HX ORTHOPAEDIC Left   
 hardware removal  
 HX OTHER SURGICAL    
 lumps removed from earlobes  HX OTHER SURGICAL    
 numerous lumps removed from various sites  HX OTHER SURGICAL    
 BCC removed  HX TONSILLECTOMY  HX VASECTOMY No family history on file. Social History Social History  Marital status:  Spouse name: N/A  
 Number of children: N/A  
 Years of education: N/A Social History Main Topics  Smoking status: Former Smoker Packs/day: 1.50 Years: 20.00 Types: Cigarettes Quit date: 1/1/1996  Smokeless tobacco: Never Used  Alcohol use Yes Comment: rarely  Drug use: No  
 Sexual activity: Not on file Other Topics Concern  Not on file Social History Narrative Prior to Admission medications Medication Sig Start Date End Date Taking? Authorizing Provider HYDROmorphone (DILAUDID) 2 mg tablet Take 1-2 Tabs by mouth every four (4) hours as needed for Pain. Max Daily Amount: 24 mg. 9/27/18  Yes Ace Rincon PA-C  
aspirin (ASPIRIN) 325 mg tablet Take 1 Tab by mouth two (2) times a day. Take for 3 weeks for DVT prophylaxis. 9/27/18  Yes Ace Rincon PA-C  
ALBUTEROL SULFATE IN Take  by inhalation as needed. Yes Historical Provider allopurinol (ZYLOPRIM) 100 mg tablet Take 100 mg by mouth two (2) times a day. Yes Historical Provider  
gabapentin (NEURONTIN) 600 mg tablet Take 1,200 mg by mouth nightly. Yes Historical Provider  
ferrous fumarate/vit Bcomp,C (SUPER B COMPLEX PO) Take  by mouth daily. Yes Historical Provider  
tamsulosin (FLOMAX) 0.4 mg capsule Take 0.4 mg by mouth daily. Yes Historical Provider  
insulin glargine (LANTUS SOLOSTAR) 100 unit/mL (3 mL) pen 82 Units by SubCUTAneous route two (2) times a day. Indications: type 2 diabetes mellitus   Yes Historical Provider  
psyllium (METAMUCIL) powd Take  by mouth as needed. Yes Historical Provider  
lisinopril (PRINIVIL, ZESTRIL) 40 mg tablet Take 40 mg by mouth daily. Indications: hypertension   Yes Historical Provider  
omeprazole (PRILOSEC) 20 mg capsule Take 20 mg by mouth daily. Pt instructed to take am of surgery. Indications: GASTROESOPHAGEAL REFLUX   Yes Historical Provider  
potassium chloride SR (KLOR-CON) 8 mEq tablet Take 8 mEq by mouth daily. Yes Historical Provider  
rosuvastatin (CRESTOR) 40 mg tablet Take 40 mg by mouth nightly. NON FORMULARY  Indications: HYPERCHOLESTEROLEMIA   Yes Historical Provider  
furosemide (LASIX) 40 mg tablet Take 40 mg by mouth two (2) times a day. Indications: EDEMA   Yes Historical Provider  
losartan (COZAAR) 50 mg tablet Take 50 mg by mouth daily. Indications: hypertension   Yes Historical Provider  
gabapentin (NEURONTIN) 300 mg capsule Take 600 mg by mouth daily. Indications: NEUROPATHIC PAIN   Yes Historical Provider  
glimepiride (AMARYL) 2 mg tablet Take 4 mg by mouth two (2) times a day. Indications: type 2 diabetes mellitus   Yes Historical Provider NIFEdipine CR (ADALAT CC) 60 mg CR tablet Take 120 mg by mouth daily. Pt instructed to take am of surgery.   Indications: hypertension   Yes Historical Provider  
naloxone [facility]) 4 mg/actuation nasal spray Use 1 spray intranasally, then discard. Repeat with new spray every 2 min as needed for opioid overdose symptoms, alternating nostrils. 18   1531 DELFINO Arreola No Known Allergies Review of Systems Gen: No fever, chills, malaise, weight loss/gain. Heent: No headache, rhinorrhea, epistaxis, ear pain, hearing loss, sinus pain, neck pain/stiffness, sore throat. Heart: No chest pain, palpitations, LUGO, pnd, or orthopnea. Resp: No cough, hemoptysis, wheezing and shortness of breath. GI: No nausea, vomiting, diarrhea, constipation, melena or hematochezia. : No urinary obstruction, dysuria or hematuria. Derm: No rash, new skin lesion or pruritis. Musc/skeletal: no bone or joint complains. Vasc: No edema, cyanosis or claudication. Endo: No heat/cold intolerance, no polyuria,polydipsia or polyphagia. Neuro: see above. Heme: No easy bruising or bleeding. Physical Exam:  
 
Physical Exam: 
Visit Vitals  /61  Pulse 67  Temp 98.7 °F (37.1 °C)  Resp 16  
 Ht 6' 1\" (1.854 m)  Wt 145.2 kg (320 lb)  SpO2 98%  BMI 42.22 kg/m2 O2 Device: Room air Temp (24hrs), Av.7 °F (37.1 °C), Min:98.7 °F (37.1 °C), Max:98.7 °F (37.1 °C) General:  Awake, cooperative, no distress. Head:  Normocephalic, without obvious abnormality, atraumatic. Eyes:  Conjunctivae/corneas clear, sclera anicteric, PERRL, EOMs intact. Nose: Nares normal. No drainage or sinus tenderness. Throat: Lips, mucosa, and tongue normal.   
Neck: Supple, symmetrical, trachea midline, no adenopathy. Lungs:   Clear to auscultation bilaterally. Heart:  Regular rate and rhythm, S1, S2 normal, no murmur, click, rub or gallop. Abdomen: Soft, non-tender. Bowel sounds normal. No masses,  No organomegaly. Extremities: Extremities normal, atraumatic, no cyanosis or edema. Capillary refill normal.  
Pulses: 2+ and symmetric all extremities. Skin: Skin color pink, turgor normal. No rashes or lesions Neurological Exam:  
Mental Status:  Alert , co-operative , memory intact . Cranial Nerves:  Intact visual fields. PERRL, EOM's full, no nystagmus, no ptosis. Facial sensation is normal. Facial movement is symmetric. Palate is midline. Normal sternocleidomastoid strength. Tongue is midline. Hearing is intact bilaterally. Motor:  5/5 strength in upper and lower proximal and distal muscles. Normal bulk and tone. Reflexes:  Deep tendon reflexes 2+/4 and symmetrical.   
Sensory:  Normal and symmetric bilaterally. Gait:  Not tested. Cerebellar:  No cerebellar signs present. Labs Reviewed: 
 
CMP: No results found for: NA, K, CL, CO2, AGAP, GLU, BUN, CREA, GFRAA, GFRNA, CA, MG, PHOS, ALB, TBIL, TP, ALB, GLOB, AGRAT, SGOT, ALT, GPT 
CBC:  
Lab Results Component Value Date/Time WBC 7.7 10/05/2018 03:10 PM  
 HGB 11.8 (L) 10/05/2018 03:10 PM  
 HCT 36.1 10/05/2018 03:10 PM  
  10/05/2018 03:10 PM  
 
All Cardiac Markers in the last 24 hours: No results found for: CPK, CK, CKMMB, CKMB, RCK3, CKMBT, CKNDX, CKND1, DANNIELLE, TROPT, TROIQ, SUKHJINDER, TROPT, TNIPOC, BNP, BNPP Procedures/imaging: see electronic medical records for all procedures/Xrays and details which were not copied into this note but were reviewed prior to creation of Plan CC: Lety Saxena MD

## 2018-10-06 VITALS
WEIGHT: 315 LBS | HEART RATE: 95 BPM | DIASTOLIC BLOOD PRESSURE: 58 MMHG | BODY MASS INDEX: 41.75 KG/M2 | OXYGEN SATURATION: 96 % | SYSTOLIC BLOOD PRESSURE: 120 MMHG | HEIGHT: 73 IN | TEMPERATURE: 97.5 F | RESPIRATION RATE: 16 BRPM

## 2018-10-06 LAB
ANION GAP SERPL CALC-SCNC: 10 MMOL/L (ref 3–18)
BUN SERPL-MCNC: 51 MG/DL (ref 7–18)
BUN/CREAT SERPL: 24 (ref 12–20)
CALCIUM SERPL-MCNC: 9 MG/DL (ref 8.5–10.1)
CHLORIDE SERPL-SCNC: 104 MMOL/L (ref 100–108)
CHOLEST SERPL-MCNC: 183 MG/DL
CO2 SERPL-SCNC: 24 MMOL/L (ref 21–32)
CREAT SERPL-MCNC: 2.09 MG/DL (ref 0.6–1.3)
ERYTHROCYTE [DISTWIDTH] IN BLOOD BY AUTOMATED COUNT: 14.7 % (ref 11.6–14.5)
GLUCOSE BLD STRIP.AUTO-MCNC: 116 MG/DL (ref 70–110)
GLUCOSE BLD STRIP.AUTO-MCNC: 173 MG/DL (ref 70–110)
GLUCOSE BLD STRIP.AUTO-MCNC: 219 MG/DL (ref 70–110)
GLUCOSE SERPL-MCNC: 121 MG/DL (ref 74–99)
HBA1C MFR BLD: 7.4 % (ref 4.5–5.6)
HCT VFR BLD AUTO: 30.8 % (ref 36–48)
HDLC SERPL-MCNC: 32 MG/DL (ref 40–60)
HDLC SERPL: 5.7 {RATIO} (ref 0–5)
HGB BLD-MCNC: 10 G/DL (ref 13–16)
LDLC SERPL CALC-MCNC: 126.8 MG/DL (ref 0–100)
LIPID PROFILE,FLP: ABNORMAL
MCH RBC QN AUTO: 31.3 PG (ref 24–34)
MCHC RBC AUTO-ENTMCNC: 32.5 G/DL (ref 31–37)
MCV RBC AUTO: 96.3 FL (ref 74–97)
PLATELET # BLD AUTO: 441 K/UL (ref 135–420)
PMV BLD AUTO: 8.9 FL (ref 9.2–11.8)
POTASSIUM SERPL-SCNC: 4.1 MMOL/L (ref 3.5–5.5)
RBC # BLD AUTO: 3.2 M/UL (ref 4.7–5.5)
SODIUM SERPL-SCNC: 138 MMOL/L (ref 136–145)
TRIGL SERPL-MCNC: 121 MG/DL (ref ?–150)
VLDLC SERPL CALC-MCNC: 24.2 MG/DL
WBC # BLD AUTO: 8.5 K/UL (ref 4.6–13.2)

## 2018-10-06 PROCEDURE — 80061 LIPID PANEL: CPT | Performed by: HOSPITALIST

## 2018-10-06 PROCEDURE — 83036 HEMOGLOBIN GLYCOSYLATED A1C: CPT | Performed by: HOSPITALIST

## 2018-10-06 PROCEDURE — 85027 COMPLETE CBC AUTOMATED: CPT | Performed by: HOSPITALIST

## 2018-10-06 PROCEDURE — 74011250637 HC RX REV CODE- 250/637: Performed by: HOSPITALIST

## 2018-10-06 PROCEDURE — 80048 BASIC METABOLIC PNL TOTAL CA: CPT | Performed by: HOSPITALIST

## 2018-10-06 PROCEDURE — 74011250636 HC RX REV CODE- 250/636: Performed by: HOSPITALIST

## 2018-10-06 PROCEDURE — 74011636637 HC RX REV CODE- 636/637: Performed by: HOSPITALIST

## 2018-10-06 PROCEDURE — 36415 COLL VENOUS BLD VENIPUNCTURE: CPT | Performed by: HOSPITALIST

## 2018-10-06 RX ADMIN — ROSUVASTATIN CALCIUM 40 MG: 10 TABLET, FILM COATED ORAL at 00:23

## 2018-10-06 RX ADMIN — INSULIN LISPRO 4 UNITS: 100 INJECTION, SOLUTION INTRAVENOUS; SUBCUTANEOUS at 11:33

## 2018-10-06 RX ADMIN — SODIUM CHLORIDE 75 ML/HR: 900 INJECTION, SOLUTION INTRAVENOUS at 08:19

## 2018-10-06 RX ADMIN — FUROSEMIDE 40 MG: 40 TABLET ORAL at 08:19

## 2018-10-06 RX ADMIN — INSULIN GLARGINE 82 UNITS: 100 INJECTION, SOLUTION SUBCUTANEOUS at 08:20

## 2018-10-06 RX ADMIN — GABAPENTIN 600 MG: 300 CAPSULE ORAL at 08:19

## 2018-10-06 RX ADMIN — ALLOPURINOL 100 MG: 100 TABLET ORAL at 08:19

## 2018-10-06 RX ADMIN — LISINOPRIL 40 MG: 20 TABLET ORAL at 08:19

## 2018-10-06 RX ADMIN — OMEPRAZOLE 20 MG: 20 CAPSULE, DELAYED RELEASE ORAL at 08:19

## 2018-10-06 RX ADMIN — ASPIRIN 325 MG ORAL TABLET 325 MG: 325 PILL ORAL at 08:19

## 2018-10-06 RX ADMIN — TAMSULOSIN HYDROCHLORIDE 0.4 MG: 0.4 CAPSULE ORAL at 08:19

## 2018-10-06 RX ADMIN — POTASSIUM CHLORIDE 10 MEQ: 10 TABLET, EXTENDED RELEASE ORAL at 08:20

## 2018-10-06 RX ADMIN — NIFEDIPINE 120 MG: 60 TABLET, FILM COATED, EXTENDED RELEASE ORAL at 11:33

## 2018-10-06 NOTE — PROGRESS NOTES
1920 Pt received from offgoing nurse without any signs or symptoms of distress. Pt vitals are stable and within normal limits. Pt bed in low position with wheels locked and call bell within reach. 1958 Assessment completed and documented in flow sheet. Pt denies any further needs at this time. Pt in NAD with bed in low position, wheels locked and call bell within reach. 2000 neuro checks completed as per protocol. 2201 neuro checks completed as per protocol. 2204 Scheduled medications administered as ordered. 0000 neuro checks completed as per protocol. 0023 Scheduled medications administered as ordered. 0200 neuro checks completed as per protocol. 0400 neuro checks completed as per protocol. 0600 neuro checks completed as per protocol. 0720 Bedside and Verbal shift change report given to Maxwell Houston RN (oncoming nurse) by Hebert Patino RN 
 (offgoing nurse). Report included the following information SBAR, Intake/Output, MAR and Recent Results.

## 2018-10-06 NOTE — ROUTINE PROCESS
Dual AVS reviewed with LILY Martinez.  All medications reviewed individually with patient. Opportunities for questions and concerns provided. Patient discharged via (mode of transport ie. Car, ambulance or air transport) car  Patient's arm band appropriately discarded.

## 2018-10-06 NOTE — PROGRESS NOTES
PATIENT CONFIRMS THAT HE HAS A HOME CPAP. PREFERS TO USE EQUIPMENT PROVIDED BY THE [facility]. ADVISED HIM THAT THERE IS A CHARGE INCURRED FOR USING OUR CPAP UNIT, BUT HE STILL WISHES TO USE OURS.

## 2018-10-06 NOTE — PROGRESS NOTES
5137: Received verbal bedside report from T. Gerhardt Punch, R.N. 
1230:Pt leaving floor with family. 1237: Gave telephone report to GOLD Montanez L.P.N.

## 2018-10-06 NOTE — DISCHARGE INSTRUCTIONS
DISCHARGE SUMMARY from Nurse    PATIENT INSTRUCTIONS:    After general anesthesia or intravenous sedation, for 24 hours or while taking prescription Narcotics:  · Limit your activities  · Do not drive and operate hazardous machinery  · Do not make important personal or business decisions  · Do  not drink alcoholic beverages  · If you have not urinated within 8 hours after discharge, please contact your surgeon on call. Report the following to your surgeon:  · Excessive pain, swelling, redness or odor of or around the surgical area  · Temperature over 100.5  · Nausea and vomiting lasting longer than 4 hours or if unable to take medications  · Any signs of decreased circulation or nerve impairment to extremity: change in color, persistent  numbness, tingling, coldness or increase pain  · Any questions    What to do at Home:  Recommended activity: Activity as tolerated      *  Please give a list of your current medications to your Primary Care Provider. *  Please update this list whenever your medications are discontinued, doses are      changed, or new medications (including over-the-counter products) are added. *  Please carry medication information at all times in case of emergency situations. These are general instructions for a healthy lifestyle:    No smoking/ No tobacco products/ Avoid exposure to second hand smoke  Surgeon General's Warning:  Quitting smoking now greatly reduces serious risk to your health. Obesity, smoking, and sedentary lifestyle greatly increases your risk for illness    A healthy diet, regular physical exercise & weight monitoring are important for maintaining a healthy lifestyle    You may be retaining fluid if you have a history of heart failure or if you experience any of the following symptoms:  Weight gain of 3 pounds or more overnight or 5 pounds in a week, increased swelling in our hands or feet or shortness of breath while lying flat in bed.   Please call your doctor as soon as you notice any of these symptoms; do not wait until your next office visit. Recognize signs and symptoms of STROKE:    F-face looks uneven    A-arms unable to move or move unevenly    S-speech slurred or non-existent    T-time-call 911 as soon as signs and symptoms begin-DO NOT go       Back to bed or wait to see if you get better-TIME IS BRAIN. Warning Signs of HEART ATTACK     Call 911 if you have these symptoms:   Chest discomfort. Most heart attacks involve discomfort in the center of the chest that lasts more than a few minutes, or that goes away and comes back. It can feel like uncomfortable pressure, squeezing, fullness, or pain.  Discomfort in other areas of the upper body. Symptoms can include pain or discomfort in one or both arms, the back, neck, jaw, or stomach.  Shortness of breath with or without chest discomfort.  Other signs may include breaking out in a cold sweat, nausea, or lightheadedness. Don't wait more than five minutes to call 911 - MINUTES MATTER! Fast action can save your life. Calling 911 is almost always the fastest way to get lifesaving treatment. Emergency Medical Services staff can begin treatment when they arrive -- up to an hour sooner than if someone gets to the hospital by car. The discharge information has been reviewed with the patient. The patient verbalized understanding. Discharge medications reviewed with the patient and appropriate educational materials and side effects teaching were provided. ___________________________________________________________________________________________________________________________________     Learning About Transient Ischemic Attack (TIA)  What is a TIA? A transient ischemic attack (TIA) means that the blood flow to a part of the brain is blocked for a short time. A TIA feels like a stroke but usually lasts only 10 to 20 minutes. Unlike a stroke, a TIA does not cause lasting brain damage.   A TIA is usually caused by a blood clot that blocks blood flow in the brain. A blood clot can form in another part of the body (often the heart) and travel through the bloodstream to the brain. When blood flow to part of the brain is blocked, the brain cells in that area are affected within seconds. This causes symptoms in parts of the body controlled by those brain cells. When the blood clot dissolves, blood flow returns, and the symptoms go away. Blood clots can be the result of hardening of the arteries (atherosclerosis) or a heart attack. Sometimes a TIA is caused by a sharp drop in blood pressure that reduces blood flow to the brain. This is called a \"low-flow\" TIA. It is not as common as a TIA caused by a blood clot. What happens after a TIA? TIA is a serious warning sign of a possible stroke in the future. If you have other medical conditions such as coronary artery disease or atherosclerosis, you may also have an increased risk for a heart attack. Talk to your doctor about your risk. Understanding your risk will help you and your doctor plan your treatment options. You can do a lot to lower your chance of having another TIA or a stroke. Medicines can help, and you may also need to make lifestyle changes. What are the symptoms? Symptoms of a TIA are the same as symptoms of a stroke. But symptoms of a TIA don't last very long. Most of the time, they go away in 10 to 20 minutes. They may include:  · Sudden numbness, tingling, weakness, or loss of movement in your face, arm, or leg, especially on only one side of your body. · Sudden vision changes. · Sudden trouble speaking. · Sudden confusion or trouble understanding simple statements. · Sudden problems with walking or balance. If you have any of these symptoms, call 911 or other emergency services right away. Ask your family, friends, and coworkers to learn the signs of a TIA. They may notice these signs before you do.  Make sure they know to call 911 if these signs appear. How is a TIA treated? If you've had a TIA, you may need more testing and treatment after you get checked by your doctor. If you have a high risk of stroke, you may have to stay in the hospital for treatment. Your treatment for a TIA may include taking medicines to prevent blood clots or a stroke, or having surgery to reopen narrow arteries. How can you prevent another TIA? · Work with your doctor to treat any health problems you have. High blood pressure, high cholesterol, atrial fibrillation, and diabetes all raise your chances of having a stroke. · Be safe with medicines. Take your medicine exactly as prescribed. Call your doctor if you think you are having a problem with your medicine. · Have a healthy lifestyle. ¨ Do not smoke or allow others to smoke around you. If you need help quitting, talk to your doctor about stop-smoking programs and medicines. These can increase your chances of quitting for good. Smoking makes a stroke more likely. ¨ Limit alcohol to 2 drinks a day for men and 1 drink a day for women. ¨ Lose weight if you need to. A healthy weight will help you keep your heart and body healthy. ¨ Be active. Ask your doctor what type and level of activity are safe for you. ¨ Eat heart-healthy foods, like fruits, vegetables, and high-fiber foods. Follow-up care is a key part of your treatment and safety. Be sure to make and go to all appointments, and call your doctor if you are having problems. It's also a good idea to know your test results and keep a list of the medicines you take. Where can you learn more? Go to http://marlena-dayo.info/. Enter X557 in the search box to learn more about \"Learning About Transient Ischemic Attack (TIA). \"  Current as of: November 21, 2017  Content Version: 11.8  © 0141-3653 [employer].  Care instructions adapted under license by [employer] (which disclaims liability or warranty for this information). If you have questions about a medical condition or this instruction, always ask your healthcare professional. [place] [address] any warranty or liability for your use of this information.

## 2018-10-06 NOTE — PROGRESS NOTES
Problem: Falls - Risk of 
Goal: *Absence of Falls Document Milly Yadav Fall Risk and appropriate interventions in the flowsheet. Outcome: Progressing Towards Goal 
Fall Risk Interventions: 
Mobility Interventions: Communicate number of staff needed for ambulation/transfer, Patient to call before getting OOB, PT Consult for mobility concerns, PT Consult for assist device competence, Strengthening exercises (ROM-active/passive), Utilize walker, cane, or other assistive device Medication Interventions: Patient to call before getting OOB, Teach patient to arise slowly Elimination Interventions: Call light in reach, Patient to call for help with toileting needs, Toileting schedule/hourly rounds, Urinal in reach

## 2018-10-06 NOTE — PROGRESS NOTES
Problem: Falls - Risk of 
Goal: *Absence of Falls Document Elvia Costa Fall Risk and appropriate interventions in the flowsheet. Outcome: Progressing Towards Goal 
Fall Risk Interventions: 
Mobility Interventions: Patient to call before getting OOB, Communicate number of staff needed for ambulation/transfer Medication Interventions: Patient to call before getting OOB, Teach patient to arise slowly Elimination Interventions: Call light in reach, Urinal in reach

## 2018-10-06 NOTE — DISCHARGE SUMMARY
Discharge Summary    Patient: Amanda Garcia MRN: [id number]  CSN: [id number]    YOB: 1945  Age: 68 y.o.   Sex: male    DOA: 10/5/2018 LOS:  LOS: 1 day   Discharge Date:      Primary Care Provider:  Stephie Pelletier MD    Admission Diagnoses: TIA (transient ischemic attack)    Discharge Diagnoses:    Problem List as of 10/6/2018  Date Reviewed: 9/26/2018          Codes Class Noted - Resolved    TIA (transient ischemic attack) ICD-10-CM: G45.9  ICD-9-CM: 435.9  10/5/2018 - Present        Orthostatic dizziness ICD-10-CM: R42  ICD-9-CM: 780.4  9/27/2018 - Present        Right knee DJD ICD-10-CM: M17.11  ICD-9-CM: 715.96  9/26/2018 - Present        Primary osteoarthritis of right knee (Chronic) ICD-10-CM: M17.11  ICD-9-CM: 715.16  9/24/2018 - Present        Left knee DJD (Chronic) ICD-10-CM: M17.12  ICD-9-CM: 715.96  3/12/2014 - Present        Hypertension (Chronic) ICD-10-CM: I10  ICD-9-CM: 401.9  2/18/2014 - Present        Gastroesophageal reflux disease (Chronic) ICD-10-CM: K21.9  ICD-9-CM: 530.81  2/18/2014 - Present        Diabetes mellitus ([address] (Chronic) ICD-10-CM: E11.9  ICD-9-CM: 250.00  2/18/2014 - Present        Sleep apnea (Chronic) ICD-10-CM: G47.30  ICD-9-CM: 780.57  2/18/2014 - Present        Hypercholesteremia (Chronic) ICD-10-CM: E78.00  ICD-9-CM: 272.0  2/18/2014 - Present        RESOLVED: Urgency of urination ICD-10-CM: R39.15  ICD-9-CM: 788.63  3/12/2013 - 3/15/2014        RESOLVED: Nocturia ICD-10-CM: R35.1  ICD-9-CM: 788.43  3/12/2013 - 3/15/2014        RESOLVED: Acute prostatitis ICD-10-CM: N41.0  ICD-9-CM: 601.0  1/30/2013 - 3/15/2014        RESOLVED: Pituitary Adenoma ICD-10-CM: D35.2, D35.3  ICD-9-CM: 227.3  1/30/2013 - 3/15/2014        RESOLVED: Hypogonadism, Pituitary Adenoma ICD-10-CM: E29.1  ICD-9-CM: 257.2  1/30/2013 - 3/15/2014        RESOLVED: Obesity ICD-10-CM: E66.9  ICD-9-CM: 278.00  1/30/2013 - 3/15/2014              Discharge Medications:     Current Discharge Medication List      CONTINUE these medications which have NOT CHANGED    Details   HYDROmorphone (DILAUDID) 2 mg tablet Take 1-2 Tabs by mouth every four (4) hours as needed for Pain. Max Daily Amount: 24 mg. Qty: 60 Tab, Refills: 0    Associated Diagnoses: Primary osteoarthritis of right knee      aspirin (ASPIRIN) 325 mg tablet Take 1 Tab by mouth two (2) times a day. Take for 3 weeks for DVT prophylaxis. Qty: 42 Tab, Refills: 0      ALBUTEROL SULFATE IN Take  by inhalation as needed. allopurinol (ZYLOPRIM) 100 mg tablet Take 100 mg by mouth two (2) times a day.      gabapentin (NEURONTIN) 600 mg tablet Take 1,200 mg by mouth nightly. ferrous fumarate/vit Bcomp,C (SUPER B COMPLEX PO) Take  by mouth daily. tamsulosin (FLOMAX) 0.4 mg capsule Take 0.4 mg by mouth daily. insulin glargine (LANTUS SOLOSTAR) 100 unit/mL (3 mL) pen 82 Units by SubCUTAneous route two (2) times a day. Indications: type 2 diabetes mellitus      psyllium (METAMUCIL) powd Take  by mouth as needed. lisinopril (PRINIVIL, ZESTRIL) 40 mg tablet Take 40 mg by mouth daily. Indications: hypertension      omeprazole (PRILOSEC) 20 mg capsule Take 20 mg by mouth daily. Pt instructed to take am of surgery. Indications: GASTROESOPHAGEAL REFLUX      potassium chloride SR (KLOR-CON) 8 mEq tablet Take 8 mEq by mouth daily. rosuvastatin (CRESTOR) 40 mg tablet Take 40 mg by mouth nightly. NON FORMULARY  Indications: HYPERCHOLESTEROLEMIA      furosemide (LASIX) 40 mg tablet Take 40 mg by mouth two (2) times a day. Indications: EDEMA      gabapentin (NEURONTIN) 300 mg capsule Take 600 mg by mouth daily. Indications: NEUROPATHIC PAIN      glimepiride (AMARYL) 2 mg tablet Take 4 mg by mouth two (2) times a day. Indications: type 2 diabetes mellitus      NIFEdipine CR (ADALAT CC) 60 mg CR tablet Take 120 mg by mouth daily. Pt instructed to take am of surgery.   Indications: hypertension      naloxone (NARCAN) 4 mg/actuation nasal spray Use 1 spray intranasally, then discard. Repeat with new spray every 2 min as needed for opioid overdose symptoms, alternating nostrils. Qty: 1 Each, Refills: 0         STOP taking these medications       losartan (COZAAR) 50 mg tablet Comments:   Reason for Stopping:               Discharge Condition: Good    Procedures :     Consults: None      PHYSICAL EXAM   Visit Vitals    /66 (BP 1 Location: Right arm, BP Patient Position: At rest)    Pulse 75    Temp 98.5 °F (36.9 °C)    Resp 16    Ht 6' 1\" (1.854 m)    Wt 145.2 kg (320 lb)    SpO2 97%    BMI 42.22 kg/m2     General: Awake, cooperative, no acute distress    HEENT: NC, Atraumatic. PERRLA, EOMI. Anicteric sclerae. Lungs:  CTA Bilaterally. No Wheezing/Rhonchi/Rales. Heart:  Regular  rhythm,  No murmur, No Rubs, No Gallops  Abdomen: Soft, Non distended, Non tender. +Bowel sounds,   Extremities: No c/c/e  Psych:   Not anxious or agitated. Neurologic:  No acute neurological deficits. Admission HPI :   Sixto Hernandez is a 68 y.o. male who has past history of DM, HTN, hyperlipidemia, recent left knee surgery is sent to ER from rehab with concerns of right upper ext numbness. Patient reports that he started feeling numbness of his RUE this afternoon, he also felt dizzy. He denies any unilateral weakness, vision changes, LOC of bowel or bladder. No chest pain, SOB. He had recent left knee surgery and is at rehab. He was discharged on aspirin 325mg bid for DVT prophylaxis. In ER code S called, initial CT head negative. His MRI brain and MRA head and neck with no acute findings.  His symptoms resolved.           Past Medical History:   Diagnosis Date    Agent orange exposure      Arthritis       osteo    Cancer (HCC)       BCC    Diabetes ([facility] [address]      Diabetes mellitus      Edema       legs    Essential hypertension      GERD (gastroesophageal reflux disease)      Hypertension      Insomnia      Morbid obesity ([facility] [address]      Neuropathy      Neuropathy 1984    Pituitary adenoma ([facility] [address]      Skin cancer      Unspecified sleep apnea       uses CPAP               Past Surgical History:   Procedure Laterality Date    HX ADENOIDECTOMY        HX HEENT         sinus, deviated septum repair    HX KNEE REPLACEMENT Left 2014    HX KNEE REPLACEMENT Right      HX MASTECTOMY Left       partial    HX ORTHOPAEDIC Left       arm- fracture    HX ORTHOPAEDIC Left       hardware removal    HX OTHER SURGICAL         lumps removed from earlobes    HX OTHER SURGICAL         numerous lumps removed from various sites    HX OTHER SURGICAL         BCC removed    HX TONSILLECTOMY        HX VASECTOMY         Hospital Course :     TIA / RUE weakness  -- resolved. Could be cervical radiculopathy. Sx are imporved. MRI /MRA brain as noted and d/w pt at length. Tele --SR. Will need to get echo as out pt. Cont ASA. He is to fu with PCP and neuro as oupt. Okay to dc back to SNF. MRI of head and MRA of head and neck with no acute findings    Continue with antiplatelet agent, he is on aspirin 325mg bid for DVT prophylaxis post knee surgery. ETHEL -- advised to cont his CPAP    HTN - continue home medications, BP controlled.      DM - continue lantus, start on SSI, diabetic diet.     Recent left knee surgery - continue with PT/OT     GERD - on PPI    Activity: Activity as tolerated    Diet: Cardiac Diet and Diabetic Diet    Follow-up:  PCP in 1 wk.   With Neuro in 1-2 wks    Disposition: SNF    Minutes spent on discharge: 50      Labs: Results:       Chemistry Recent Labs      10/06/18   0547  10/05/18   1718   GLU  121*  167*   NA  138  135*   K  4.1  4.8   CL  104  101   CO2  24  21   BUN  51*  53*   CREA  2.09*  2.40*   CA  9.0  9.0   AGAP  10  13   BUCR  24*  22*   AP   --   101   TP   --   7.5   ALB   --   2.9*   GLOB   --   4.6*   AGRAT   --   0.6*      CBC w/Diff Recent Labs      10/06/18   0547  10/05/18   1510   WBC  8.5  7.7 RBC  3.20*  3.76*   HGB  10.0*  11.8*   HCT  30.8*  36.1   PLT  441*  420   GRANS   --   59   LYMPH   --   31   EOS   --   2      Cardiac Enzymes Recent Labs      10/05/18   1718   CPK  369*   CKND1  0.4      Coagulation Recent Labs      10/05/18   1510   PTP  12.9   INR  1.0   APTT  21.7*       Lipid Panel Lab Results   Component Value Date/Time    Cholesterol, total 183 10/06/2018 05:47 AM    HDL Cholesterol 32 (L) 10/06/2018 05:47 AM    LDL, calculated 126.8 (H) 10/06/2018 05:47 AM    VLDL, calculated 24.2 10/06/2018 05:47 AM    Triglyceride 121 10/06/2018 05:47 AM    CHOL/HDL Ratio 5.7 (H) 10/06/2018 05:47 AM      BNP No results for input(s): BNPP in the last 72 hours. Liver Enzymes Recent Labs      10/05/18   1718   TP  7.5   ALB  2.9*   AP  101   SGOT  40*      Thyroid Studies No results found for: T4, T3U, TSH, TSHEXT         Significant Diagnostic Studies: Mra Brain Wo Cont    Result Date: 10/5/2018  EXAM: MRA HEAD INDICATION: Right upper extremity numbness, weakness COMPARISON: No prior study TECHNIQUE:  MR angiography of the head was performed utilizing 3-D time of flight axial acquisitions with multiplanar reconstructions. _______________________ FINDINGS:  There is no focal high-grade stenosis within the intracranial arteries. No focal carotid siphon stenosis is seen. The carotid terminus is unremarkable. No focal anterior cerebral artery stenosis is seen. The middle cerebral artery bifurcations are unremarkable. The vertebral arteries are relatively equal in size. PICA origins are unremarkable. No focal basilar artery stenosis is seen. There is irregularity and increase in density extending towards the left lateral margin of the distal basilar artery, just proximal to the superior cerebellar artery origin, best appreciated on the reconstruction images. There may be an associated fenestration on source axial images, not definitive.  Estimated maximal diameter is 5.6 mm versus normal sized adjacent basilar artery diameter of 4.6 mm. Posterior cerebral arteries are unremarkable. _______________________     IMPRESSION: 1. No high-grade intracranial stenosis. 2. Slight irregularity with eccentric dilatation along the left lateral aspect of distal basilar artery perhaps related to administration. Small broad-based, fusiform aneurysm is possible. No saccular aneurysm is seen. Mra Neck Wo Cont    Result Date: 10/5/2018  EXAM: MRA NECK W/O CONTRAST INDICATION: Right upper extending numbness, weakness COMPARISON: No prior study TECHNIQUE:  MR angiography of the neck was performed utilizing axial 2-D and 3-D noncontrast time-of-flight acquisitions with multiplanar reconstructions. Additional Inhance 3-D velocity acquisition performed. No gadolinium was administered . _______________________ FINDINGS:  The left carotid bifurcation is minimally irregular. Estimated minimal luminal diameter of proximal ICA is 6.4 mm. Estimated diameter of normal distal cervical segment ICA is 5.2 mm. Estimated stenosis of left ICA based upon NASCET criteria is 0%. Tortuosity and mild kinking is present along the mid cervical segment without high-grade stenosis. The right carotid bifurcation is minimally irregular. Estimated minimal luminal diameter of proximal ICA is 5.2 mm. Estimated diameter of normal distal cervical segment ICA is 4.5 mm. Estimated stenosis of right ICA based upon NASCET criteria is 0%. Mild kinking is present along the mid cervical segment without significant stenosis. The origins of the great vessels are not well evaluated. The vertebral arteries are relatively equal in size. No focal vertebral artery stenosis is seen. Antegrade flow is seen in bilateral vertebral arteries. The visualized portions of the intracranial arteries are unremarkable.   _______________________     IMPRESSION: 1. No hemodynamically significant carotid stenosis based on NASCET criteria.  2.  No vertebral artery stenosis is seen.    Mri Brain Wo Cont    Result Date: 10/5/2018  EXAM: MRI BRAIN W/O CONTRAST INDICATION: Right upper extremity numbness, weakness COMPARISON: MR pituitary 7/19/2010 TECHNIQUE: Multiplanar multi sequence MR imaging of the brain was performed utilizing axial T2, FLAIR, diffusion, T2 gradient echo, coronal T2, and multiplanar T1 pre contrast imaging. No gadolinium was administered due to specific clinician request. _______________________ FINDINGS: VENTRICLES/EXTRA-AXIAL SPACES: The ventricles and sulci are mildly enlarged consistent with diffuse volume loss. BRAIN PARENCHYMA: Minimal amount of T2/FLAIR hyperintense white matter lesions are seen within the periventricular and subcortical white matter , including patchy abnormal signal in the brainstem, which are nonspecific, but likely represent chronic small vessel changes. No evidence of intracranial mass effect, midline shift, or herniation. No abnormal restricted diffusion to suggest acute infarct. No susceptibility artifact to suggest intraparenchymal hemorrhage. Known small left lateral pituitary lesion is perhaps subtly seen on coronal image 11, but not well evaluated on this study. VASCULATURE:  The major intracranial vascular flow voids are grossly normal. ORBITS: The bilateral lenses are absent, likely due to prior cataract surgery. PARANASAL SINUSES/MASTOIDS: Minimal mucoperiosteal thickening is scattered in paranasal sinuses without evidence of air-fluid level. Visualized mastoid air cells are clear. OSSEOUS STRUCTURES: Unremarkable OTHER: None.  ________________________     IMPRESSION: 1. No acute infarct, hemorrhage, mass effect, or herniation. 2. Minimal nonspecific white matter disease likely representing chronic small vessel changes. 3. Known left lateral pituitary lesion is not well seen on this routine brain study .     Ct Head Wo Cont    Result Date: 10/5/2018  EXAMINATION:  CT head noncontrast COMPARISON: None HISTORY: CODE S stroke alert TECHNIQUE: Noncontrasted axial images were obtained through the patient's brain from the vertex of the skull through the skull base. Bone and soft tissue windows were reviewed. One or more dose reduction techniques were used on this CT: automated exposure control, adjustment of the mAs and/or kVp according to patient's size, and iterative reconstruction techniques. The specific techniques utilized on this CT exam have been documented in patient's electronic medical record. FINDINGS: mass effect, midline shift or hemorrhage. Ventricles are normal in size, position and configuration for patient's age. No abnormal extra-axial fluid collections are seen. No territorial signs of infarct. The bony calvarium appears intact without acute displaced fracture. The visualized paranasal sinuses and mastoid air cells are aerated. IMPRESSION: 1. No acute intracranial pathology. Above code S stroke alert results discussed with the ER attending at 2:45 PM        No results found for this or any previous visit.         CC: Ying Lee MD

## 2018-10-07 RX ORDER — SODIUM CHLORIDE 9 MG/ML
15 INJECTION INTRAMUSCULAR; INTRAVENOUS; SUBCUTANEOUS ONCE
Status: ACTIVE | OUTPATIENT
Start: 2018-10-07 | End: 2018-10-07

## 2018-10-08 ENCOUNTER — HOSPITAL ENCOUNTER (OUTPATIENT)
Dept: LAB | Age: 73
Discharge: HOME | End: 2018-10-08

## 2018-10-08 LAB
ANION GAP SERPL CALC-SCNC: 11 MMOL/L (ref 3–18)
BUN SERPL-MCNC: 41 MG/DL (ref 7–18)
BUN/CREAT SERPL: 24 (ref 12–20)
CALCIUM SERPL-MCNC: 9.3 MG/DL (ref 8.5–10.1)
CHLORIDE SERPL-SCNC: 103 MMOL/L (ref 100–108)
CO2 SERPL-SCNC: 25 MMOL/L (ref 21–32)
CREAT SERPL-MCNC: 1.73 MG/DL (ref 0.6–1.3)
ERYTHROCYTE [DISTWIDTH] IN BLOOD BY AUTOMATED COUNT: 14.6 % (ref 11.6–14.5)
GLUCOSE SERPL-MCNC: 173 MG/DL (ref 74–99)
HCT VFR BLD AUTO: 32.1 % (ref 36–48)
HGB BLD-MCNC: 10.2 G/DL (ref 13–16)
MCH RBC QN AUTO: 30.6 PG (ref 24–34)
MCHC RBC AUTO-ENTMCNC: 31.8 G/DL (ref 31–37)
MCV RBC AUTO: 96.4 FL (ref 74–97)
PLATELET # BLD AUTO: 508 K/UL (ref 135–420)
PMV BLD AUTO: 9.2 FL (ref 9.2–11.8)
POTASSIUM SERPL-SCNC: 5.4 MMOL/L (ref 3.5–5.5)
RBC # BLD AUTO: 3.33 M/UL (ref 4.7–5.5)
SODIUM SERPL-SCNC: 139 MMOL/L (ref 136–145)
WBC # BLD AUTO: 7.8 K/UL (ref 4.6–13.2)

## 2018-10-08 PROCEDURE — 80048 BASIC METABOLIC PNL TOTAL CA: CPT | Performed by: INTERNAL MEDICINE

## 2018-10-08 PROCEDURE — 85027 COMPLETE CBC AUTOMATED: CPT | Performed by: INTERNAL MEDICINE

## 2018-10-15 ENCOUNTER — HOSPITAL ENCOUNTER (OUTPATIENT)
Dept: LAB | Age: 73
Discharge: HOME | End: 2018-10-15

## 2018-10-15 LAB
ANION GAP SERPL CALC-SCNC: 9 MMOL/L (ref 3–18)
BASOPHILS # BLD: 0.1 K/UL (ref 0–0.1)
BASOPHILS NFR BLD: 1 % (ref 0–2)
BUN SERPL-MCNC: 58 MG/DL (ref 7–18)
BUN/CREAT SERPL: 25 (ref 12–20)
CALCIUM SERPL-MCNC: 9.7 MG/DL (ref 8.5–10.1)
CHLORIDE SERPL-SCNC: 106 MMOL/L (ref 100–108)
CO2 SERPL-SCNC: 26 MMOL/L (ref 21–32)
CREAT SERPL-MCNC: 2.31 MG/DL (ref 0.6–1.3)
DIFFERENTIAL METHOD BLD: ABNORMAL
EOSINOPHIL # BLD: 0.3 K/UL (ref 0–0.4)
EOSINOPHIL NFR BLD: 5 % (ref 0–5)
ERYTHROCYTE [DISTWIDTH] IN BLOOD BY AUTOMATED COUNT: 14.9 % (ref 11.6–14.5)
GLUCOSE SERPL-MCNC: 65 MG/DL (ref 74–99)
HCT VFR BLD AUTO: 33.5 % (ref 36–48)
HGB BLD-MCNC: 10.6 G/DL (ref 13–16)
LYMPHOCYTES # BLD: 3 K/UL (ref 0.9–3.6)
LYMPHOCYTES NFR BLD: 45 % (ref 21–52)
MCH RBC QN AUTO: 30.8 PG (ref 24–34)
MCHC RBC AUTO-ENTMCNC: 31.6 G/DL (ref 31–37)
MCV RBC AUTO: 97.4 FL (ref 74–97)
MONOCYTES # BLD: 0.5 K/UL (ref 0.05–1.2)
MONOCYTES NFR BLD: 7 % (ref 3–10)
NEUTS SEG # BLD: 2.8 K/UL (ref 1.8–8)
NEUTS SEG NFR BLD: 42 % (ref 40–73)
PLATELET # BLD AUTO: 485 K/UL (ref 135–420)
PMV BLD AUTO: 9.2 FL (ref 9.2–11.8)
POTASSIUM SERPL-SCNC: 5.9 MMOL/L (ref 3.5–5.5)
RBC # BLD AUTO: 3.44 M/UL (ref 4.7–5.5)
SODIUM SERPL-SCNC: 141 MMOL/L (ref 136–145)
WBC # BLD AUTO: 6.6 K/UL (ref 4.6–13.2)

## 2018-10-15 PROCEDURE — 80048 BASIC METABOLIC PNL TOTAL CA: CPT | Performed by: INTERNAL MEDICINE

## 2018-10-15 PROCEDURE — 85025 COMPLETE CBC W/AUTO DIFF WBC: CPT | Performed by: INTERNAL MEDICINE

## 2018-10-18 ENCOUNTER — HOSPITAL ENCOUNTER (OUTPATIENT)
Dept: LAB | Age: 73
Discharge: HOME | End: 2018-10-18

## 2018-10-18 LAB
ANION GAP SERPL CALC-SCNC: 11 MMOL/L (ref 3–18)
BUN SERPL-MCNC: 54 MG/DL (ref 7–18)
BUN/CREAT SERPL: 24 (ref 12–20)
CALCIUM SERPL-MCNC: 9.2 MG/DL (ref 8.5–10.1)
CHLORIDE SERPL-SCNC: 106 MMOL/L (ref 100–108)
CO2 SERPL-SCNC: 23 MMOL/L (ref 21–32)
CREAT SERPL-MCNC: 2.27 MG/DL (ref 0.6–1.3)
GLUCOSE SERPL-MCNC: 50 MG/DL (ref 74–99)
POTASSIUM SERPL-SCNC: 5.8 MMOL/L (ref 3.5–5.5)
SODIUM SERPL-SCNC: 140 MMOL/L (ref 136–145)

## 2018-10-18 PROCEDURE — 80048 BASIC METABOLIC PNL TOTAL CA: CPT | Performed by: INTERNAL MEDICINE

## 2018-10-19 ENCOUNTER — HOSPITAL ENCOUNTER (OUTPATIENT)
Dept: LAB | Age: 73
Discharge: HOME | End: 2018-10-19

## 2018-10-19 LAB
ANION GAP SERPL CALC-SCNC: 9 MMOL/L (ref 3–18)
BUN SERPL-MCNC: 50 MG/DL (ref 7–18)
BUN/CREAT SERPL: 24 (ref 12–20)
CALCIUM SERPL-MCNC: 9.8 MG/DL (ref 8.5–10.1)
CHLORIDE SERPL-SCNC: 107 MMOL/L (ref 100–108)
CO2 SERPL-SCNC: 24 MMOL/L (ref 21–32)
CREAT SERPL-MCNC: 2.11 MG/DL (ref 0.6–1.3)
GLUCOSE SERPL-MCNC: 79 MG/DL (ref 74–99)
POTASSIUM SERPL-SCNC: 5.7 MMOL/L (ref 3.5–5.5)
SODIUM SERPL-SCNC: 140 MMOL/L (ref 136–145)

## 2018-10-19 PROCEDURE — 80048 BASIC METABOLIC PNL TOTAL CA: CPT | Performed by: INTERNAL MEDICINE

## 2018-10-22 ENCOUNTER — HOSPITAL ENCOUNTER (OUTPATIENT)
Dept: LAB | Age: 73
Discharge: HOME | End: 2018-10-22
Payer: MEDICARE

## 2018-10-22 LAB
ANION GAP SERPL CALC-SCNC: 8 MMOL/L (ref 3–18)
BASOPHILS # BLD: 0 K/UL (ref 0–0.1)
BASOPHILS NFR BLD: 0 % (ref 0–3)
BUN SERPL-MCNC: 68 MG/DL (ref 7–18)
BUN/CREAT SERPL: 31 (ref 12–20)
CALCIUM SERPL-MCNC: 9.5 MG/DL (ref 8.5–10.1)
CHLORIDE SERPL-SCNC: 108 MMOL/L (ref 100–108)
CO2 SERPL-SCNC: 24 MMOL/L (ref 21–32)
CREAT SERPL-MCNC: 2.21 MG/DL (ref 0.6–1.3)
DIFFERENTIAL METHOD BLD: ABNORMAL
EOSINOPHIL # BLD: 1 K/UL (ref 0–0.4)
EOSINOPHIL NFR BLD: 13 % (ref 0–5)
ERYTHROCYTE [DISTWIDTH] IN BLOOD BY AUTOMATED COUNT: 14.9 % (ref 11.6–14.5)
GLUCOSE SERPL-MCNC: 67 MG/DL (ref 74–99)
HCT VFR BLD AUTO: 32 % (ref 36–48)
HGB BLD-MCNC: 10.1 G/DL (ref 13–16)
LYMPHOCYTES # BLD: 4.1 K/UL (ref 0.8–3.5)
LYMPHOCYTES NFR BLD: 54 % (ref 20–51)
MCH RBC QN AUTO: 30.5 PG (ref 24–34)
MCHC RBC AUTO-ENTMCNC: 31.6 G/DL (ref 31–37)
MCV RBC AUTO: 96.7 FL (ref 74–97)
MONOCYTES # BLD: 0.2 K/UL (ref 0–1)
MONOCYTES NFR BLD: 3 % (ref 2–9)
NEUTS SEG # BLD: 2.3 K/UL (ref 1.8–8)
NEUTS SEG NFR BLD: 30 % (ref 42–75)
PLATELET # BLD AUTO: 320 K/UL (ref 135–420)
PMV BLD AUTO: 9.9 FL (ref 9.2–11.8)
POTASSIUM SERPL-SCNC: 5.7 MMOL/L (ref 3.5–5.5)
RBC # BLD AUTO: 3.31 M/UL (ref 4.7–5.5)
RBC MORPH BLD: ABNORMAL
SODIUM SERPL-SCNC: 140 MMOL/L (ref 136–145)
WBC # BLD AUTO: 7.6 K/UL (ref 4.6–13.2)
WBC MORPH BLD: ABNORMAL

## 2018-10-22 PROCEDURE — 80048 BASIC METABOLIC PNL TOTAL CA: CPT | Performed by: INTERNAL MEDICINE

## 2018-10-22 PROCEDURE — 85025 COMPLETE CBC W/AUTO DIFF WBC: CPT | Performed by: INTERNAL MEDICINE

## 2018-11-24 LAB
ATRIAL RATE: 64 BPM
CALCULATED P AXIS, ECG09: 84 DEGREES
CALCULATED R AXIS, ECG10: 80 DEGREES
CALCULATED T AXIS, ECG11: 62 DEGREES
DIAGNOSIS, 93000: NORMAL
P-R INTERVAL, ECG05: 190 MS
Q-T INTERVAL, ECG07: 404 MS
QRS DURATION, ECG06: 88 MS
QTC CALCULATION (BEZET), ECG08: 416 MS
VENTRICULAR RATE, ECG03: 64 BPM
